# Patient Record
Sex: MALE | Race: WHITE | NOT HISPANIC OR LATINO | Employment: UNEMPLOYED | URBAN - METROPOLITAN AREA
[De-identification: names, ages, dates, MRNs, and addresses within clinical notes are randomized per-mention and may not be internally consistent; named-entity substitution may affect disease eponyms.]

---

## 2018-09-21 ENCOUNTER — ANESTHESIA EVENT (OUTPATIENT)
Dept: GASTROENTEROLOGY | Facility: HOSPITAL | Age: 53
End: 2018-09-21
Payer: COMMERCIAL

## 2018-09-24 ENCOUNTER — HOSPITAL ENCOUNTER (OUTPATIENT)
Facility: HOSPITAL | Age: 53
Setting detail: OUTPATIENT SURGERY
Discharge: HOME | End: 2018-09-24
Attending: COLON & RECTAL SURGERY | Admitting: COLON & RECTAL SURGERY
Payer: COMMERCIAL

## 2018-09-24 ENCOUNTER — ANESTHESIA (OUTPATIENT)
Dept: GASTROENTEROLOGY | Facility: HOSPITAL | Age: 53
End: 2018-09-24
Payer: COMMERCIAL

## 2018-09-24 VITALS
HEIGHT: 67 IN | BODY MASS INDEX: 29.82 KG/M2 | RESPIRATION RATE: 16 BRPM | DIASTOLIC BLOOD PRESSURE: 60 MMHG | OXYGEN SATURATION: 98 % | SYSTOLIC BLOOD PRESSURE: 102 MMHG | HEART RATE: 48 BPM | WEIGHT: 190 LBS | TEMPERATURE: 98.6 F

## 2018-09-24 DIAGNOSIS — R19.7 DIARRHEA: ICD-10-CM

## 2018-09-24 DIAGNOSIS — R10.9 ABDOMINAL PAIN: ICD-10-CM

## 2018-09-24 PROCEDURE — 88305 TISSUE EXAM BY PATHOLOGIST: CPT | Performed by: PATHOLOGY

## 2018-09-24 RX ORDER — PROPOFOL 10 MG/ML
INJECTION, EMULSION INTRAVENOUS AS NEEDED
Status: DISCONTINUED | OUTPATIENT
Start: 2018-09-24 | End: 2018-09-24 | Stop reason: SURG

## 2018-09-24 RX ORDER — SODIUM CHLORIDE 9 MG/ML
125 INJECTION, SOLUTION INTRAVENOUS CONTINUOUS
Status: DISCONTINUED | OUTPATIENT
Start: 2018-09-24 | End: 2018-09-24 | Stop reason: HOSPADM

## 2018-09-24 RX ADMIN — PROPOFOL 50 MG: 10 INJECTION, EMULSION INTRAVENOUS at 10:01

## 2018-09-24 RX ADMIN — PROPOFOL 50 MG: 10 INJECTION, EMULSION INTRAVENOUS at 10:09

## 2018-09-24 RX ADMIN — PROPOFOL 50 MG: 10 INJECTION, EMULSION INTRAVENOUS at 10:05

## 2018-09-24 RX ADMIN — PROPOFOL 150 MG: 10 INJECTION, EMULSION INTRAVENOUS at 09:57

## 2018-09-24 RX ADMIN — LIDOCAINE HYDROCHLORIDE 60 MG: 20 INJECTION, SOLUTION INTRAVENOUS at 09:57

## 2018-09-24 RX ADMIN — SODIUM CHLORIDE 125 ML/HR: 0.9 INJECTION, SOLUTION INTRAVENOUS at 09:26

## 2018-09-24 NOTE — DISCHARGE SUMMARY
[facility]                                                                                 DISCHARGE SUMMARY        PATIENT NAME: Brien Casarez    :  1965  MRN: [id number]  Pt Location: [facility]    DISCHARGE DATE: 2018    PROCEDURE: Procedure(s) (LRB):  COLONOSCOPY with polypectomy (N/A)    Anesthesia Type: IV Sedation with Anesthesia    Complications: None    Specimen(s):  ID Type Source Tests Collected by Time Destination   1 : Ascending colon polyps x2 Tissue Large Intestine, Right/Ascending Colon TISSUE EXAM Manuel Martinez MD 2018 1004        HOSPITAL COURSE: The patient was admitted for elective procedure  The procedure was uncomplicated and the the patient was discharged home post procedure          SIGNATURE: Manuel Martinez MD  DATE: 2018  TIME: 10:14 AM

## 2018-09-24 NOTE — OP NOTE
OPERATIVE REPORT  PATIENT NAME: Gloria Olson    :  1965  MRN: [id number]  Pt Location: [facility]    SURGERY DATE: 2018    Indications:  Recurrent abdominal pain    Procedure:  Colonoscopy to cecum with snare polypectomies    Findings:  Ascending colon polyps    Anesthesia Type: IV Sedation with Anesthesia    Complications: None      Procedure: The patient was in the left lateral position  Sedation was administered by anesthesia  Rectal exam was unremarkable  The scope was introduced and passed to the cecum  The scope was withdrawn  In the ascending colon with 2 5 mm polyps excised by cold snare  The scope was withdrawn  The patient has scattered sigmoid diverticulosis  There was some mild erythema of the sigmoid colon consistent with a mild diverticular colitis or possibly a healing diverticulitis  The scope was removed  Impression:  Mild inflammation of the sigmoid colon may account for some of the patient's symptoms  Plan:  Consider antibiotics if symptoms are continue  Specimen(s):  ID Type Source Tests Collected by Time Destination   1 : Ascending colon polyps x2 Tissue Large Intestine, Right/Ascending Colon TISSUE EXAM Gabino Harding MD 2018 1004          Attestation: I was present for the entire procedure        Patient Disposition: PACU      SIGNATURE: Gabino Harding MD  DATE: 2018  TIME: 10:13 AM

## 2018-09-24 NOTE — ANESTHESIA PREPROCEDURE EVALUATION
Review of Systems/Medical History  Patient summary reviewed  Chart reviewed  No history of anesthetic complications     Cardiovascular  Negative cardio ROS Hypertension on > 1 medication,    Pulmonary  Negative pulmonary ROS        GI/Hepatic    Bowel prep       Negative  ROS        Endo/Other  Negative endo/other ROS History of thyroid disease ,      GYN  Negative gynecology ROS          Hematology  Negative hematology ROS      Musculoskeletal  Negative musculoskeletal ROS        Neurology  Negative neurology ROS      Psychology   Negative psychology ROS              Physical Exam    Airway    Mallampati score: I  TM Distance: >3 FB  Neck ROM: full     Dental   No notable dental hx     Cardiovascular  Comment: Negative ROS, Rhythm: regular, Rate: normal, Cardiovascular exam normal    Pulmonary  Pulmonary exam normal Breath sounds clear to auscultation,     Other Findings        Anesthesia Plan  ASA Score- 2     Anesthesia Type-     Plan Factors-    Induction- intravenous  Postoperative Plan- Plan for postoperative opioid use  Informed Consent- Anesthetic plan and risks discussed with patient  I personally reviewed this patient with the CRNA  Discussed and agreed on the Anesthesia Plan with the CRNA  Noa Mckenzie

## 2018-09-24 NOTE — DISCHARGE INSTRUCTIONS
[facility]  [facility] [address], [address] [place] [address] [place]  Phone: [phone]    DISCHARGE INSTRUCTIONS:    1   ___ Complete Exam - Normal    2   ___ Exam normal, but entire colon not seen  We will discuss this with you  3   ___ Polyp(s) removed by "burning" - NO pathology report will follow    4   _2__ Polyp(s) removed by excision  Pathology report will be available in 4-5 days   Someone from our office will call you with results  5   ___ Exam prompted biopsies  Pathology report will be available in 4-5 days   Someone from our office will call you with results  6   ___ Exam demonstrated findings that need treatment  Prescriptions will be   Given to you  Return to our office in ____ weeks  Please call for appt  7   ___ Original office visit or colonoscopy findings necessitate an office visit  Please call to set up a new appointment    8   ___ Medication  __________________________________________        [address]:    - Go straight home and rest today    - No driving or drinking alcohol for 24 hours    - Resume regular diet and medications unless otherwise instructed  Coumadin and Plavix are blood thinners  You can resume these medications on __________  IF YOU ARE HAVING ANY FEVER, BLEEDING OR PERSISTENT PAIN IN THE ABDOMEN, PLEASE CALL OUR OFFICE ANY DAY OR TIME  [phone]        Colorectal Polyps   WHAT YOU NEED TO KNOW:   Colorectal polyps are small growths of tissue in the lining of the colon and rectum  Most polyps are hyperplastic polyps and are usually benign (noncancerous)  Certain types of polyps, called adenomatous polyps, may turn into cancer  DISCHARGE INSTRUCTIONS:   Follow up with your healthcare provider or gastroenterologist as directed: You may need to return for more tests, such as another colonoscopy  Write down your questions so you remember to ask them during your visits    Reduce your risk for colorectal polyps:   · Eat a variety of healthy foods:  Healthy foods include fruit, vegetables, whole-grain breads, low-fat dairy products, beans, lean meat, and fish  Ask if you need to be on a special diet  · Maintain a healthy weight:  Ask your healthcare provider if you need to lose weight and how much you need to lose  Ask for help with a weight loss program     · Exercise:  Begin to exercise slowly and do more as you get stronger  Talk with your healthcare provider before you start an exercise program      · Limit alcohol:  Your risk for polyps increases the more you drink  · Do not smoke: If you smoke, it is never too late to quit  Ask for information about how to stop  For support and more information:   · Charanjit Isaacs ([facility]) [address], [place] [address]  Phone: 2- [phone]  Web Address: www digestive  [facility]  Contact your healthcare provider or gastroenterologist if:   · You have a fever  · You have chills, a cough, or feel weak and achy  · You have abdominal pain that does not go away or gets worse after you take medicine  · Your abdomen is swollen  · You are losing weight without trying  · You have questions or concerns about your condition or care  Seek care immediately or call 911 if:   · You have sudden shortness of breath  · You have a fast heart rate, fast breathing, or are too dizzy to stand up  · You have severe abdominal pain  · You see blood in your bowel movement  © 2017 [employer] Information is for End User's use only and may not be sold, redistributed or otherwise used for commercial purposes  All illustrations and images included in CareNotes® are the copyrighted property of [employer]  or Jonathan [employer]  The above information is an  only  It is not intended as medical advice for individual conditions or treatments   Talk to your doctor, nurse or pharmacist before following any medical regimen to see if it is safe and effective for you  Diverticulosis   WHAT YOU NEED TO KNOW:   Diverticulosis is a condition that causes small pockets called diverticula to form in your intestine  These pockets make it difficult for bowel movements to pass through your digestive system  DISCHARGE INSTRUCTIONS:   Seek care immediately if:   · You have severe pain on the left side of your lower abdomen  · Your bowel movements are bright or dark red  Contact your healthcare provider if:   · You have a fever and chills  · You feel dizzy or lightheaded  · You have nausea, or you are vomiting  · You have a change in your bowel movements  · You have questions or concerns about your condition or care  Medicines:   · Medicines  to soften your bowel movements may be given  You may also need medicines to treat symptoms such as bloating and pain  · Take your medicine as directed  Contact your healthcare provider if you think your medicine is not helping or if you have side effects  Tell him or her if you are allergic to any medicine  Keep a list of the medicines, vitamins, and herbs you take  Include the amounts, and when and why you take them  Bring the list or the pill bottles to follow-up visits  Carry your medicine list with you in case of an emergency  Self-care: The goal of treatment is to manage any symptoms you have and prevent other problems such as diverticulitis  Diverticulitis is swelling or infection of the diverticula  Your healthcare provider may recommend any of the following:  · Eat a variety of high-fiber foods  High-fiber foods help you have regular bowel movements  High-fiber foods include cooked beans, fruits, vegetables, and some cereals  Most adults need 25 to 35 grams of fiber each day  Your healthcare provider may recommend that you have more  Ask your healthcare provider how much fiber you need  Increase fiber slowly   You may have abdominal discomfort, bloating, and gas if you add fiber to your diet too quickly  You may need to take a fiber supplement if you are not getting enough fiber from food  · Drink liquids as directed  You may need to drink 2 to 3 liters (8 to 12 cups) of liquids every day  Ask your healthcare provider how much liquid to drink each day and which liquids are best for you  · Apply heat  on your abdomen for 20 to 30 minutes every 2 hours for as many days as directed  Heat helps decrease pain and muscle spasms  Help prevent diverticulitis or other symptoms: The following may help decrease your risk for diverticulitis or symptoms, such as bleeding  Talk to your provider about these or other things you can do to prevent problems that may occur with diverticulosis  · Exercise regularly  Ask your healthcare provider about the best exercise plan for you  Exercise can help you have regular bowel movements  Get 30 minutes of exercise on most days of the week  · Maintain a healthy weight  Ask your healthcare provider how much you should weigh  Ask him or her to help you create a weight loss plan if you are overweight  · Do not smoke  Nicotine and other chemicals in cigarettes increase your risk for diverticulitis  Ask your healthcare provider for information if you currently smoke and need help to quit  E-cigarettes or smokeless tobacco still contain nicotine  Talk to your healthcare provider before you use these products  · Ask your healthcare provider if it is safe to take NSAIDs  NSAIDs may increase your risk of diverticulitis  Follow up with your healthcare provider as directed:  Write down your questions so you remember to ask them during your visits  © 2017 [employer] Information is for End User's use only and may not be sold, redistributed or otherwise used for commercial purposes   All illustrations and images included in CareNotes® are the copyrighted property of [employer]  or Jonathan Torres  The above information is an  only  It is not intended as medical advice for individual conditions or treatments  Talk to your doctor, nurse or pharmacist before following any medical regimen to see if it is safe and effective for you

## 2018-09-24 NOTE — PRE-PROCEDURE INSTRUCTIONS
Endo process reviewed with pt  And mother  Pt  Has had colonoscopy previously  Pt  Comfortable without further questions  Call bell in reach on rail

## 2021-11-24 ENCOUNTER — LAB REQUISITION (OUTPATIENT)
Dept: LAB | Facility: HOSPITAL | Age: 56
End: 2021-11-24
Payer: COMMERCIAL

## 2021-11-24 DIAGNOSIS — Z12.11 ENCOUNTER FOR SCREENING FOR MALIGNANT NEOPLASM OF COLON: ICD-10-CM

## 2021-11-24 DIAGNOSIS — Z86.010 PERSONAL HISTORY OF COLONIC POLYPS: ICD-10-CM

## 2021-11-24 PROCEDURE — 88305 TISSUE EXAM BY PATHOLOGIST: CPT | Performed by: PATHOLOGY

## 2022-09-07 ENCOUNTER — TELEPHONE (OUTPATIENT)
Dept: GASTROENTEROLOGY | Facility: CLINIC | Age: 57
End: 2022-09-07

## 2022-09-07 ENCOUNTER — CONSULT (OUTPATIENT)
Dept: GASTROENTEROLOGY | Facility: CLINIC | Age: 57
End: 2022-09-07
Payer: COMMERCIAL

## 2022-09-07 VITALS
HEART RATE: 64 BPM | BODY MASS INDEX: 30.29 KG/M2 | DIASTOLIC BLOOD PRESSURE: 99 MMHG | HEIGHT: 67 IN | WEIGHT: 193 LBS | SYSTOLIC BLOOD PRESSURE: 168 MMHG

## 2022-09-07 DIAGNOSIS — K21.00 GASTROESOPHAGEAL REFLUX DISEASE WITH ESOPHAGITIS WITHOUT HEMORRHAGE: Primary | ICD-10-CM

## 2022-09-07 DIAGNOSIS — R13.19 ESOPHAGEAL DYSPHAGIA: ICD-10-CM

## 2022-09-07 DIAGNOSIS — Z86.010 PERSONAL HISTORY OF COLONIC POLYPS: ICD-10-CM

## 2022-09-07 DIAGNOSIS — Z87.19 HISTORY OF DIVERTICULITIS: ICD-10-CM

## 2022-09-07 PROBLEM — K21.9 GERD (GASTROESOPHAGEAL REFLUX DISEASE): Status: ACTIVE | Noted: 2022-09-07

## 2022-09-07 PROBLEM — Z86.0100 PERSONAL HISTORY OF COLONIC POLYPS: Status: ACTIVE | Noted: 2022-09-07

## 2022-09-07 PROBLEM — Z86.0101 HX OF ADENOMATOUS COLONIC POLYPS: Status: ACTIVE | Noted: 2022-09-07

## 2022-09-07 PROBLEM — Z86.0101 HX OF ADENOMATOUS COLONIC POLYPS: Status: RESOLVED | Noted: 2022-09-07 | Resolved: 2022-09-07

## 2022-09-07 PROCEDURE — 99204 OFFICE O/P NEW MOD 45 MIN: CPT | Performed by: INTERNAL MEDICINE

## 2022-09-07 RX ORDER — OMEPRAZOLE 40 MG/1
40 CAPSULE, DELAYED RELEASE ORAL 2 TIMES DAILY
COMMUNITY
Start: 2022-09-01 | End: 2022-09-22

## 2022-09-07 RX ORDER — SUCRALFATE ORAL 1 G/10ML
1 SUSPENSION ORAL
COMMUNITY
Start: 2022-09-01 | End: 2022-09-22

## 2022-09-07 NOTE — TELEPHONE ENCOUNTER
[facility] Assessment    Name: Ruddy Villaseñor  YOB: 1965  Last Height: 5' 7" (1 702 m)  Last weight: 87 5 kg (193 lb)  BMI: 30 23 kg/m²  Procedure: Egd  Diagnosis: see order  Date of procedure: 9/22/22  Prep:   Responsible : yes  Phone#: [phone]    Name completing form: Davis Kwong  Date form completed: 09/07/22      If the patient answers yes to any of these questions, schedule in a hospital  Are you pregnant: No  Do you rely on a wheelchair for mobility: No  Have you been diagnosed with End Stage Renal Disease (ESRD): No  Do you need oxygen during the day: No  Have you had a heart attack or stroke within the past three months: No  Have you had a seizure within the past three months: No  Have you ever been informed by anesthesia that you have a difficult airway: No  Additional Questions  Have you had any cardiac testing or are under the care of a Cardiologist (see cardiac list): No  Cardiac list:   Do you have an implanted cardiac defibrillator: No (Comment:  This patient should be scheduled in the hospital)    Have any bleeding problems, such as anemia or hemophilia (If patient has H&H result below 8, schedule in hospital   H&H must be within 30 days of procedure): No    Had an organ transplant within the past 3 months: No    Do you have any present infections: No  Do you get short of breath when walking a few blocks: No  Have you been diagnosed with diabetes: No  Comments (provide cardiac provider information if applicable):

## 2022-09-07 NOTE — TELEPHONE ENCOUNTER
Scheduled date of EGD(as of today):9/22/22   Physician performing EGD:Dr Ritu Santiago   Location of EGD:  [facility]  Instructions reviewed with patient by: ivan  Clearances: n/a

## 2022-09-07 NOTE — PROGRESS NOTES
[facility] - Outpatient Consultation  Radha Griffith 62 y o  male MRN: [id number]  Encounter: [id number]          ASSESSMENT AND PLAN:      1  Gastroesophageal reflux disease with esophagitis without hemorrhage  For year and half  Worsening in nature  Recent exacerbations as described below  - EGD; Future    2  Esophageal dysphagia  Somewhat improved  We evaluate for mechanical/mucosal /neoplastic  He will continue with current medications   - EGD; Future    3  Personal history of colonic polyps  He will continue follow-up with his colorectal surgeon  4  History of diverticulitis  In July of 2020  He will otherwise follow-up in 3 weeks  ______________________________________________________________________    HPI:  Very pleasant 71-year-old gentleman with body year to half of intermittent gastroesophageal reflux  Started in October of last year took 20 mg of omeprazole continued with that  Was initiated on Celebrex for history of hip arthritis  In August had another exacerbation and started taking 40 mg a day  Two weeks ago had some pepperoni pizza had difficulty afterwards along with some mild dysphagia without melena bright red blood per rectum  Was started on 40 mg twice a day with Carafate  His symptomatology has improved  He denies any weight loss there is no family history of colon cancer or any other GI cancers for that matter  He is working on anti-reflux diet we discussed measures  There is no other family history of cancers of the gastrointestinal tract, pancreatitis, hepatitis, IBD, celiac disease  He does have a history of colon polyps his colon cancer screening is done elsewhere in up-to-date  REVIEW OF SYSTEMS:    CONSTITUTIONAL: Denies any fever, chills, rigors, and weight loss  HEENT: No earache or tinnitus  Denies hearing loss or visual disturbances  CARDIOVASCULAR: No chest pain or palpitations     RESPIRATORY: Denies any cough, hemoptysis, shortness of breath or dyspnea on exertion  GASTROINTESTINAL: As noted in the History of Present Illness  GENITOURINARY: No problems with urination  Denies any hematuria or dysuria  NEUROLOGIC: No dizziness or vertigo, denies headaches  MUSCULOSKELETAL: Denies any muscle or joint pain  SKIN: Denies skin rashes or itching  ENDOCRINE: Denies excessive thirst  Denies intolerance to heat or cold  PSYCHOSOCIAL: Denies depression or anxiety  Denies any recent memory loss  Historical Information   Past Medical History:   Diagnosis Date    Colon polyp     Disease of thyroid gland     GERD (gastroesophageal reflux disease)     Hyperlipidemia     Hypertension     Tubular adenoma of colon     hx of     Past Surgical History:   Procedure Laterality Date    COLONOSCOPY N/A 5/6/2016    Procedure: COLONOSCOPY;  Surgeon: Julia Dozier MD;  Location: [facility]; Service:     HEMORRHOID SURGERY      [facility] COLONOSCOPY FLX DX W/COLLJ SPEC WHEN PFRMD N/A 9/24/2018    Procedure: COLONOSCOPY with polypectomy;  Surgeon: Rose Mathew MD;  Location: [facility]; Service: Colorectal     Social History   Social History     Substance and Sexual Activity   Alcohol Use Yes    Comment: social     Social History     Substance and Sexual Activity   Drug Use No     Social History     Tobacco Use   Smoking Status Never Smoker   Smokeless Tobacco Former User    Types: Chew     History reviewed  No pertinent family history  Meds/Allergies       Current Outpatient Medications:     atenolol (TENORMIN) 50 mg tablet    levothyroxine 175 mcg tablet    losartan (COZAAR) 100 MG tablet    omeprazole (PriLOSEC) 40 MG capsule    sucralfate (CARAFATE) 1 g/10 mL suspension    Allergies   Allergen Reactions    Lisinopril      Other reaction(s): fatigue and lightheaded, anxiety    Verapamil      anxiety           Objective     Blood pressure 168/99, pulse 64, height 5' 7" (1 702 m), weight 87 5 kg (193 lb)   Body mass index is 30 23 kg/m²  PHYSICAL EXAM:      General Appearance:   Alert, cooperative, no distress   HEENT:   Normocephalic, atraumatic, anicteric      Neck:  Supple, symmetrical, trachea midline   Lungs:   Clear to auscultation bilaterally; no rales, rhonchi or wheezing; respirations unlabored    Heart[de-identified]   Regular rate and rhythm; no murmur, rub, or gallop  Abdomen:   Soft, non-tender, non-distended; normal bowel sounds; no masses, no organomegaly    Genitalia:   Deferred    Rectal:   Deferred    Extremities:  No cyanosis, clubbing or edema    Pulses:  2+ and symmetric    Skin:  No jaundice, rashes, or lesions    Lymph nodes:  No palpable cervical lymphadenopathy        Lab Results:   No visits with results within 1 Day(s) from this visit  Latest known visit with results is:   Lab Requisition on 11/24/2021   Component Date Value    Case Report 11/24/2021                      Value:Surgical Pathology Report                         Case: [id number]                                   Authorizing Provider:  Prince Burrows MD              Collected:           11/24/2021 1235              Ordering Location:     [address] [place] [address]      Received:            11/24/2021 [facility]                                     Hospital Specialty                                                                                  Laboratory                                                                   Pathologist:           Beatrice Bird MD                                                              Specimen:    Polyp, Colorectal, cecal and ascending x2                                                  Final Diagnosis 11/24/2021                      Value: This result contains rich text formatting which cannot be displayed here   Note 11/24/2021                      Value: This result contains rich text formatting which cannot be displayed here   Additional Information 11/24/2021                      Value: This result contains rich text formatting which cannot be displayed here   Synoptic Checklist 11/24/2021                      Value:                            COLON/RECTUM POLYP FORM - GI - All Specimens                                                                                     :    Adenoma(s)      Gross Description 11/24/2021                      Value: This result contains rich text formatting which cannot be displayed here  Radiology Results:   No results found

## 2022-09-07 NOTE — H&P (VIEW-ONLY)
[facility] - Outpatient Consultation  Abraham Jeronimo 62 y o  male MRN: [id number]  Encounter: [id number]          ASSESSMENT AND PLAN:      1  Gastroesophageal reflux disease with esophagitis without hemorrhage  For year and half  Worsening in nature  Recent exacerbations as described below  - EGD; Future    2  Esophageal dysphagia  Somewhat improved  We evaluate for mechanical/mucosal /neoplastic  He will continue with current medications   - EGD; Future    3  Personal history of colonic polyps  He will continue follow-up with his colorectal surgeon  4  History of diverticulitis  In July of 2020  He will otherwise follow-up in 3 weeks  ______________________________________________________________________    HPI:  Very pleasant 49-year-old gentleman with body year to half of intermittent gastroesophageal reflux  Started in October of last year took 20 mg of omeprazole continued with that  Was initiated on Celebrex for history of hip arthritis  In August had another exacerbation and started taking 40 mg a day  Two weeks ago had some pepperoni pizza had difficulty afterwards along with some mild dysphagia without melena bright red blood per rectum  Was started on 40 mg twice a day with Carafate  His symptomatology has improved  He denies any weight loss there is no family history of colon cancer or any other GI cancers for that matter  He is working on anti-reflux diet we discussed measures  There is no other family history of cancers of the gastrointestinal tract, pancreatitis, hepatitis, IBD, celiac disease  He does have a history of colon polyps his colon cancer screening is done elsewhere in up-to-date  REVIEW OF SYSTEMS:    CONSTITUTIONAL: Denies any fever, chills, rigors, and weight loss  HEENT: No earache or tinnitus  Denies hearing loss or visual disturbances  CARDIOVASCULAR: No chest pain or palpitations     RESPIRATORY: Denies any cough, hemoptysis, shortness of breath or dyspnea on exertion  GASTROINTESTINAL: As noted in the History of Present Illness  GENITOURINARY: No problems with urination  Denies any hematuria or dysuria  NEUROLOGIC: No dizziness or vertigo, denies headaches  MUSCULOSKELETAL: Denies any muscle or joint pain  SKIN: Denies skin rashes or itching  ENDOCRINE: Denies excessive thirst  Denies intolerance to heat or cold  PSYCHOSOCIAL: Denies depression or anxiety  Denies any recent memory loss  Historical Information   Past Medical History:   Diagnosis Date    Colon polyp     Disease of thyroid gland     GERD (gastroesophageal reflux disease)     Hyperlipidemia     Hypertension     Tubular adenoma of colon     hx of     Past Surgical History:   Procedure Laterality Date    COLONOSCOPY N/A 5/6/2016    Procedure: COLONOSCOPY;  Surgeon: Cinthya Nunez MD;  Location: [facility]; Service:     HEMORRHOID SURGERY      [place] COLONOSCOPY FLX DX W/COLLJ SPEC WHEN PFRMD N/A 9/24/2018    Procedure: COLONOSCOPY with polypectomy;  Surgeon: Karen Acevedo MD;  Location: [facility]; Service: Colorectal     Social History   Social History     Substance and Sexual Activity   Alcohol Use Yes    Comment: social     Social History     Substance and Sexual Activity   Drug Use No     Social History     Tobacco Use   Smoking Status Never Smoker   Smokeless Tobacco Former User    Types: Chew     History reviewed  No pertinent family history  Meds/Allergies       Current Outpatient Medications:     atenolol (TENORMIN) 50 mg tablet    levothyroxine 175 mcg tablet    losartan (COZAAR) 100 MG tablet    omeprazole (PriLOSEC) 40 MG capsule    sucralfate (CARAFATE) 1 g/10 mL suspension    Allergies   Allergen Reactions    Lisinopril      Other reaction(s): fatigue and lightheaded, anxiety    Verapamil      anxiety           Objective     Blood pressure 168/99, pulse 64, height 5' 7" (1 702 m), weight 87 5 kg (193 lb)   Body mass index is 30 23 kg/m²  PHYSICAL EXAM:      General Appearance:   Alert, cooperative, no distress   HEENT:   Normocephalic, atraumatic, anicteric      Neck:  Supple, symmetrical, trachea midline   Lungs:   Clear to auscultation bilaterally; no rales, rhonchi or wheezing; respirations unlabored    Heart[de-identified]   Regular rate and rhythm; no murmur, rub, or gallop  Abdomen:   Soft, non-tender, non-distended; normal bowel sounds; no masses, no organomegaly    Genitalia:   Deferred    Rectal:   Deferred    Extremities:  No cyanosis, clubbing or edema    Pulses:  2+ and symmetric    Skin:  No jaundice, rashes, or lesions    Lymph nodes:  No palpable cervical lymphadenopathy        Lab Results:   No visits with results within 1 Day(s) from this visit  Latest known visit with results is:   Lab Requisition on 11/24/2021   Component Date Value    Case Report 11/24/2021                      Value:Surgical Pathology Report                         Case: [id number]                                   Authorizing Provider:  Davian Jose MD              Collected:           11/24/2021 1235              Ordering Location:     [address] [place] [address]      Received:            11/24/2021 [address] [place] [address] Specialty                                                                                  Laboratory                                                                   Pathologist:           Angelique Jackson MD                                                              Specimen:    Polyp, Colorectal, cecal and ascending x2                                                  Final Diagnosis 11/24/2021                      Value: This result contains rich text formatting which cannot be displayed here   Note 11/24/2021                      Value: This result contains rich text formatting which cannot be displayed here   Additional Information 11/24/2021                      Value: This result contains rich text formatting which cannot be displayed here   Synoptic Checklist 11/24/2021                      Value:                            COLON/RECTUM POLYP FORM - GI - All Specimens                                                                                     :    Adenoma(s)      Gross Description 11/24/2021                      Value: This result contains rich text formatting which cannot be displayed here  Radiology Results:   No results found

## 2022-09-16 ENCOUNTER — TELEPHONE (OUTPATIENT)
Dept: GASTROENTEROLOGY | Facility: CLINIC | Age: 57
End: 2022-09-16

## 2022-09-16 NOTE — TELEPHONE ENCOUNTER
I lmom confirming pt's egd scheduled on 9/22/22 at [facility] with Dr Alyssa Wright  Informed pt [facility] would be calling 1-2 days prior with arrival time  Informed pt to be npo after midnight and  would need a  the day of the procedure due to being under sedation  I asked pt to please call back if has not received instructions or if has any questions  Advised pt to contact insurance if has any questions regarding coverage of procedure

## 2022-09-22 ENCOUNTER — HOSPITAL ENCOUNTER (OUTPATIENT)
Dept: GASTROENTEROLOGY | Facility: AMBULATORY SURGERY CENTER | Age: 57
Discharge: HOME | End: 2022-09-22
Payer: COMMERCIAL

## 2022-09-22 ENCOUNTER — ANESTHESIA (OUTPATIENT)
Dept: GASTROENTEROLOGY | Facility: AMBULATORY SURGERY CENTER | Age: 57
End: 2022-09-22

## 2022-09-22 ENCOUNTER — ANESTHESIA EVENT (OUTPATIENT)
Dept: GASTROENTEROLOGY | Facility: AMBULATORY SURGERY CENTER | Age: 57
End: 2022-09-22

## 2022-09-22 VITALS
DIASTOLIC BLOOD PRESSURE: 86 MMHG | HEIGHT: 67 IN | BODY MASS INDEX: 29.82 KG/M2 | OXYGEN SATURATION: 97 % | RESPIRATION RATE: 18 BRPM | WEIGHT: 190 LBS | TEMPERATURE: 98.1 F | SYSTOLIC BLOOD PRESSURE: 118 MMHG | HEART RATE: 57 BPM

## 2022-09-22 DIAGNOSIS — R13.19 ESOPHAGEAL DYSPHAGIA: ICD-10-CM

## 2022-09-22 DIAGNOSIS — K21.00 GASTROESOPHAGEAL REFLUX DISEASE WITH ESOPHAGITIS WITHOUT HEMORRHAGE: ICD-10-CM

## 2022-09-22 PROCEDURE — 43239 EGD BIOPSY SINGLE/MULTIPLE: CPT | Performed by: INTERNAL MEDICINE

## 2022-09-22 RX ORDER — PROPOFOL 10 MG/ML
INJECTION, EMULSION INTRAVENOUS AS NEEDED
Status: DISCONTINUED | OUTPATIENT
Start: 2022-09-22 | End: 2022-09-22

## 2022-09-22 RX ORDER — SERTRALINE HYDROCHLORIDE 25 MG/1
25 TABLET, FILM COATED ORAL DAILY
COMMUNITY
Start: 2022-09-15 | End: 2023-09-15

## 2022-09-22 RX ORDER — LIDOCAINE HYDROCHLORIDE 20 MG/ML
INJECTION, SOLUTION EPIDURAL; INFILTRATION; INTRACAUDAL; PERINEURAL AS NEEDED
Status: DISCONTINUED | OUTPATIENT
Start: 2022-09-22 | End: 2022-09-22

## 2022-09-22 RX ORDER — FAMOTIDINE 20 MG/1
20 TABLET, FILM COATED ORAL
Qty: 90 TABLET | Refills: 1 | Status: SHIPPED | OUTPATIENT
Start: 2022-09-22

## 2022-09-22 RX ORDER — SODIUM CHLORIDE 9 MG/ML
20 INJECTION, SOLUTION INTRAVENOUS CONTINUOUS
Status: DISCONTINUED | OUTPATIENT
Start: 2022-09-22 | End: 2022-09-26 | Stop reason: HOSPADM

## 2022-09-22 RX ORDER — SODIUM CHLORIDE 9 MG/ML
INJECTION, SOLUTION INTRAVENOUS CONTINUOUS PRN
Status: DISCONTINUED | OUTPATIENT
Start: 2022-09-22 | End: 2022-09-22

## 2022-09-22 RX ADMIN — LIDOCAINE HYDROCHLORIDE 100 MG: 20 INJECTION, SOLUTION EPIDURAL; INFILTRATION; INTRACAUDAL; PERINEURAL at 12:19

## 2022-09-22 RX ADMIN — PROPOFOL 30 MG: 10 INJECTION, EMULSION INTRAVENOUS at 12:22

## 2022-09-22 RX ADMIN — SODIUM CHLORIDE: 9 INJECTION, SOLUTION INTRAVENOUS at 11:57

## 2022-09-22 RX ADMIN — PROPOFOL 100 MG: 10 INJECTION, EMULSION INTRAVENOUS at 12:19

## 2022-09-22 RX ADMIN — PROPOFOL 20 MG: 10 INJECTION, EMULSION INTRAVENOUS at 12:25

## 2022-09-22 NOTE — INTERVAL H&P NOTE
H&P reviewed  After examining the patient I find no changes in the patients condition since the H&P had been written      Vitals:    09/22/22 1129   BP: 121/79   Pulse: 58   Resp: 18   Temp: 98 1 °F (36 7 °C)   SpO2: 97%

## 2022-09-22 NOTE — ANESTHESIA POSTPROCEDURE EVALUATION
Post-Op Assessment Note    CV Status:  Stable  Pain Score: 0    Pain management: adequate     Mental Status:  Alert and awake   Hydration Status:  Euvolemic   PONV Controlled:  Controlled   Airway Patency:  Patent      Post Op Vitals Reviewed: Yes      Staff: Anesthesiologist, CRNA         No complications documented      BP   110/70   Temp      Pulse  56   Resp  20    SpO2   99% Benzoyl Peroxide Pregnancy And Lactation Text: This medication is Pregnancy Category C. It is unknown if benzoyl peroxide is excreted in breast milk.

## 2022-09-22 NOTE — ANESTHESIA PREPROCEDURE EVALUATION
Procedure:  EGD    Relevant Problems   GI/HEPATIC   (+) Esophageal dysphagia   (+) GERD (gastroesophageal reflux disease)      NEURO/PSYCH   (+) History of diverticulitis   (+) Personal history of colonic polyps        Physical Exam    Airway    Mallampati score: II  TM Distance: >3 FB  Neck ROM: full     Dental   No notable dental hx     Cardiovascular      Pulmonary      Other Findings        Anesthesia Plan  ASA Score- 2     Anesthesia Type- IV sedation with anesthesia with ASA Monitors  Additional Monitors:   Airway Plan:     Comment: Npo after MN (sip of water with meds @ 08:00)  Plan Factors-Exercise tolerance (METS): >4 METS  Chart reviewed  Patient summary reviewed  Patient is not a current smoker  Induction- intravenous  Postoperative Plan-     Informed Consent- Anesthetic plan and risks discussed with patient  I personally reviewed this patient with the CRNA  Discussed and agreed on the Anesthesia Plan with the CRNA  Karrie Nissen

## 2022-12-09 ENCOUNTER — OFFICE VISIT (OUTPATIENT)
Dept: GASTROENTEROLOGY | Facility: CLINIC | Age: 57
End: 2022-12-09

## 2022-12-09 VITALS — WEIGHT: 191 LBS | BODY MASS INDEX: 29.98 KG/M2 | HEIGHT: 67 IN

## 2022-12-09 DIAGNOSIS — K60.2 ANAL FISSURE: Primary | ICD-10-CM

## 2022-12-09 DIAGNOSIS — K21.9 GASTROESOPHAGEAL REFLUX DISEASE, UNSPECIFIED WHETHER ESOPHAGITIS PRESENT: ICD-10-CM

## 2022-12-09 DIAGNOSIS — R13.19 ESOPHAGEAL DYSPHAGIA: ICD-10-CM

## 2022-12-09 RX ORDER — LEVOTHYROXINE SODIUM 0.2 MG/1
TABLET ORAL
COMMUNITY
Start: 2022-11-17

## 2022-12-09 RX ORDER — CELECOXIB 200 MG/1
200 CAPSULE ORAL EVERY MORNING
COMMUNITY
Start: 2022-10-29

## 2022-12-09 RX ORDER — NITROGLYCERIN 4 MG/G
OINTMENT RECTAL EVERY 12 HOURS SCHEDULED
Qty: 30 G | Refills: 1 | Status: SHIPPED | OUTPATIENT
Start: 2022-12-09

## 2022-12-09 RX ORDER — FAMOTIDINE 20 MG/1
40 TABLET, FILM COATED ORAL DAILY
Qty: 60 TABLET | Refills: 2 | Status: SHIPPED | OUTPATIENT
Start: 2022-12-09

## 2022-12-09 NOTE — PROGRESS NOTES
[facility] - Outpatient Follow-up Note  Yessi Yoder 62 y o  male MRN: [id number]  Encounter: [id number]          ASSESSMENT AND PLAN:      1  Esophageal dysphagia  Patient with actual difficulty swallowing but just feels like solid food is not passing in his lower esophagus, suspect symptoms are related to esophagitis rather than dysmotility and no evidence of obstructive lesion on EGD  We will plan for barium swallow with 13 mm tablet for further evaluation   - FL barium swallow; Future  - famotidine (PEPCID) 20 mg tablet; Take 2 tablets (40 mg total) by mouth daily  Dispense: 60 tablet; Refill: 2    2  Gastroesophageal reflux disease, unspecified whether esophagitis present  Recommend to continue on the Omeprazole 40 mg in the morning and 40 mg of famotidine at bedtime and then can decrease the dose of the Pepcid at bedtime when he is feeling better    3  Anal fissure  Patient has been watching his diet and did eat something that caused hard stool and now had a tearing feeling with a little bit of blood but still is having discomfort, Rectiv has been ordered and we will get this if insurance approves this  - nitroglycerin (Rectiv) 0 4 %; Insert into the rectum every 12 (twelve) hours  Dispense: 30 g; Refill: 1    ______________________________________________________________________    SUBJECTIVE:       This is a 59-year-old male who presents today for follow-up to EGD  Pt has had symptoms that started in October of last year took 20 mg of omeprazole continued with that  Was initiated on Celebrex for history of hip arthritis  In August  had another exacerbation and started taking 40 mg a day      Patient reports that he was doing well for about 3 weeks after his endoscopy in November but then had some dietary indiscretion and he also had eaten late at night and now was having symptoms of discomfort in his chest and feeling like something has to go over that area while eating, but otherwise does not have any actual dysphagia but he does get a sensation in his chest generally in the morning when swallowing his levothyroxine  He has now been taking the omeprazole 40 mg in the morning and then 20 mg of Pepcid in the evening  EGD had shown gastritis and esophagitis with reflux with no obstructive pathology  Biopsies were benign      There is no other family history of cancers of the gastrointestinal tract, pancreatitis, hepatitis, IBD, celiac disease  He does have a history of colon polyps his colon cancer screening is done elsewhere in up-to-date  Last colonoscopy was in November 2021 and is due in November 2024  REVIEW OF SYSTEMS IS OTHERWISE NEGATIVE  Historical Information   Past Medical History:   Diagnosis Date   • Anxiety    • Colon polyp    • Disease of thyroid gland    • Esophageal dysphagia    • GERD (gastroesophageal reflux disease)    • Hyperlipidemia    • Hypertension    • Seasonal allergies    • Tubular adenoma of colon     hx of     Past Surgical History:   Procedure Laterality Date   • COLONOSCOPY N/A 05/06/2016    Procedure: COLONOSCOPY;  Surgeon: Demetris Lott MD;  Location: [facility]; Service:    • EGD     • HEMORRHOID SURGERY     • [place] COLONOSCOPY FLX DX W/COLLJ SPEC WHEN PFRMD N/A 09/24/2018    Procedure: COLONOSCOPY with polypectomy;  Surgeon: Jarad Farias MD;  Location: [facility];   Service: Colorectal   • ROTATOR CUFF REPAIR      left   • TONSILLECTOMY       Social History   Social History     Substance and Sexual Activity   Alcohol Use Yes    Comment: social     Social History     Substance and Sexual Activity   Drug Use No     Social History     Tobacco Use   Smoking Status Never   Smokeless Tobacco Former   • Types: Chew     Family History   Problem Relation Age of Onset   • Colon cancer Maternal Grandfather        Meds/Allergies       Current Outpatient Medications:   •  atenolol (TENORMIN) 50 mg tablet  •  famotidine (PEPCID) 20 mg tablet  • famotidine (PEPCID) 20 mg tablet  •  levothyroxine 175 mcg tablet  •  losartan (COZAAR) 100 MG tablet  •  nitroglycerin (Rectiv) 0 4 %  •  sertraline (ZOLOFT) 25 mg tablet  •  celecoxib (CeleBREX) 200 mg capsule  •  levothyroxine 200 mcg tablet  •  omeprazole (PriLOSEC) 40 MG capsule    Allergies   Allergen Reactions   • Lisinopril      Other reaction(s): fatigue and lightheaded, anxiety   • Verapamil      anxiety           Objective     Height 5' 7" (1 702 m), weight 86 6 kg (191 lb)  Body mass index is 29 91 kg/m²  PHYSICAL EXAM:      General Appearance:   Alert, cooperative, no distress   HEENT:   Normocephalic, atraumatic, anicteric      Neck:  Supple, symmetrical, trachea midline   Lungs:   Clear to auscultation bilaterally; no rales, rhonchi or wheezing; respirations unlabored    Heart[de-identified]   Regular rate and rhythm; no murmur, rub, or gallop  Abdomen:   Soft, non-tender, non-distended; normal bowel sounds; no masses, no organomegaly    Genitalia:   Deferred    Rectal:   Deferred    Extremities:  No cyanosis, clubbing or edema    Pulses:  2+ and symmetric    Skin:  No jaundice, rashes, or lesions    Lymph nodes:  No palpable cervical lymphadenopathy        Lab Results:   No visits with results within 1 Day(s) from this visit     Latest known visit with results is:   Lab Requisition on 11/24/2021   Component Date Value   • Case Report 11/24/2021                      Value:Surgical Pathology Report                         Case: [id number]                                   Authorizing Provider:  Lexy Patrick MD              Collected:           11/24/2021 1235              Ordering Location:     [address] [place] [address]      Received:            11/24/2021 [address] [place] [address] Specialty                                                                                  Laboratory                                                                   Pathologist:           Judith Gay MD Specimen:    Polyp, Colorectal, cecal and ascending x2                                                 • Final Diagnosis 11/24/2021                      Value: This result contains rich text formatting which cannot be displayed here  • Note 11/24/2021                      Value: This result contains rich text formatting which cannot be displayed here  • Additional Information 11/24/2021                      Value: This result contains rich text formatting which cannot be displayed here  • Synoptic Checklist 11/24/2021                      Value:                            COLON/RECTUM POLYP FORM - GI - All Specimens                                                                                     :    Adenoma(s)     • Gross Description 11/24/2021                      Value: This result contains rich text formatting which cannot be displayed here  Radiology Results:   No results found

## 2022-12-16 ENCOUNTER — HOSPITAL ENCOUNTER (OUTPATIENT)
Dept: RADIOLOGY | Facility: HOSPITAL | Age: 57
Discharge: HOME | End: 2022-12-16

## 2022-12-16 DIAGNOSIS — R13.19 ESOPHAGEAL DYSPHAGIA: ICD-10-CM

## 2023-03-09 DIAGNOSIS — R13.19 ESOPHAGEAL DYSPHAGIA: ICD-10-CM

## 2023-03-09 RX ORDER — FAMOTIDINE 20 MG/1
TABLET, FILM COATED ORAL
Qty: 60 TABLET | Refills: 2 | Status: SHIPPED | OUTPATIENT
Start: 2023-03-09

## 2023-03-15 ENCOUNTER — TELEPHONE (OUTPATIENT)
Dept: GASTROENTEROLOGY | Facility: CLINIC | Age: 58
End: 2023-03-15

## 2023-03-15 ENCOUNTER — OFFICE VISIT (OUTPATIENT)
Dept: GASTROENTEROLOGY | Facility: CLINIC | Age: 58
End: 2023-03-15

## 2023-03-15 VITALS
HEIGHT: 67 IN | HEART RATE: 66 BPM | WEIGHT: 198 LBS | BODY MASS INDEX: 31.08 KG/M2 | DIASTOLIC BLOOD PRESSURE: 89 MMHG | SYSTOLIC BLOOD PRESSURE: 159 MMHG

## 2023-03-15 DIAGNOSIS — Z87.19 HISTORY OF DIVERTICULITIS: ICD-10-CM

## 2023-03-15 DIAGNOSIS — K60.2 ANAL FISSURE: ICD-10-CM

## 2023-03-15 DIAGNOSIS — R13.19 ESOPHAGEAL DYSPHAGIA: Primary | ICD-10-CM

## 2023-03-15 DIAGNOSIS — Z86.010 PERSONAL HISTORY OF COLONIC POLYPS: ICD-10-CM

## 2023-03-15 DIAGNOSIS — K21.9 GASTROESOPHAGEAL REFLUX DISEASE, UNSPECIFIED WHETHER ESOPHAGITIS PRESENT: ICD-10-CM

## 2023-03-15 NOTE — PATIENT INSTRUCTIONS
Next screening colonoscopy 11/24 due to history of colon polyps  Start Metamucil daily  Should the irritation in the posterior taste buds persist ears nose and throat consultation

## 2023-03-15 NOTE — PROGRESS NOTES
[facility] - Outpatient Follow-up Note  Jose Adrian 62 y o  male MRN: [id number]  Encounter: [id number]          ASSESSMENT AND PLAN:      1  Gastroesophageal reflux disease, unspecified whether esophagitis present  Well-controlled  We will change omeprazole to every other day continue with Pepcid 40 mg at night  He will avoid drinking his 1 ounce of bourbon each night appears to be a relation ship between that and his dysphagia  2  Esophageal dysphagia  Negative upper endoscopy and barium swallow with 13 mm tablet  Describes a period where posterior taste buds were irritated and swollen this is since improved  Should this not totally resolved ears nose and throat consultation  3  Anal fissure  Comes and goes with hard stools  Will work on increasing fiber and he will start Metamucil  Normal rectal exam today  4  Personal history of colonic polyps  He is due in November 2024 was getting his colonoscopies elsewhere desires to get them done here    5  History of diverticulitis  No abdominal pain  He will otherwise follow-up in 4 months     ______________________________________________________________________    SUBJECTIVE: Very pleasant 80-year-old gentleman we see for gastroesophageal reflux, nonobstructive dysphagia  Had recent EGD which is unremarkable including biopsies of the thoracic esophagus  He describes some irritation of his posterior taste buds this is improving  Suggested ears nose and throat consult if this persists or and does not resolve  No melena bright red blood per rectum  Occasionally does experience rectal pain after hard bowel movements  Discussed increasing fiber  Was treated for fissure in the past   Currently no abdominal pain      REVIEW OF SYSTEMS IS OTHERWISE NEGATIVE        Historical Information   Past Medical History:   Diagnosis Date   • Anxiety    • Colon polyp    • Disease of thyroid gland    • Esophageal dysphagia    • GERD (gastroesophageal reflux disease)    • Hyperlipidemia    • Hypertension    • Seasonal allergies    • Tubular adenoma of colon     hx of     Past Surgical History:   Procedure Laterality Date   • COLONOSCOPY N/A 05/06/2016    Procedure: COLONOSCOPY;  Surgeon: Jenny Graham MD;  Location: [facility]; Service:    • EGD     • HEMORRHOID SURGERY     • [place] COLONOSCOPY FLX DX W/COLLJ SPEC WHEN PFRMD N/A 09/24/2018    Procedure: COLONOSCOPY with polypectomy;  Surgeon: Alysa Mason MD;  Location: [facility]; Service: Colorectal   • ROTATOR CUFF REPAIR      left   • TONSILLECTOMY       Social History   Social History     Substance and Sexual Activity   Alcohol Use Yes    Comment: social     Social History     Substance and Sexual Activity   Drug Use No     Social History     Tobacco Use   Smoking Status Never   Smokeless Tobacco Former   • Types: Chew     Family History   Problem Relation Age of Onset   • Colon cancer Maternal Grandfather        Meds/Allergies       Current Outpatient Medications:   •  atenolol (TENORMIN) 50 mg tablet  •  celecoxib (CeleBREX) 200 mg capsule  •  famotidine (PEPCID) 20 mg tablet  •  famotidine (PEPCID) 20 mg tablet  •  levothyroxine 175 mcg tablet  •  levothyroxine 200 mcg tablet  •  losartan (COZAAR) 100 MG tablet  •  nitroglycerin (Rectiv) 0 4 %  •  omeprazole (PriLOSEC) 40 MG capsule  •  sertraline (ZOLOFT) 25 mg tablet    Allergies   Allergen Reactions   • Lisinopril      Other reaction(s): fatigue and lightheaded, anxiety   • Verapamil      anxiety           Objective     Blood pressure 159/89, pulse 66, height 5' 7" (1 702 m), weight 89 8 kg (198 lb)  Body mass index is 31 01 kg/m²        PHYSICAL EXAM:      General Appearance:   Alert, cooperative, no distress   HEENT:   Normocephalic, atraumatic, anicteric      Neck:  Supple, symmetrical, trachea midline   Lungs:   Clear to auscultation bilaterally; no rales, rhonchi or wheezing; respirations unlabored    Heart[de-identified]   Regular rate and rhythm; no murmur, rub, or gallop  Abdomen:   Soft, non-tender, non-distended; normal bowel sounds; no masses, no organomegaly    Genitalia:   Deferred    Rectal:    No masses blood fissures   Extremities:  No cyanosis, clubbing or edema    Pulses:  2+ and symmetric    Skin:  No jaundice, rashes, or lesions    Lymph nodes:  No palpable cervical lymphadenopathy        Lab Results:   No visits with results within 1 Day(s) from this visit  Latest known visit with results is:   Lab Requisition on 11/24/2021   Component Date Value   • Case Report 11/24/2021                      Value:Surgical Pathology Report                         Case: [id number]                                   Authorizing Provider:  Connie Abdul MD              Collected:           11/24/2021 1235              Ordering Location:     [address] [place] [address]      Received:            11/24/2021 [address] [place] [address] Specialty                                                                                  Laboratory                                                                   Pathologist:           Rashad Monsivais MD                                                              Specimen:    Polyp, Colorectal, cecal and ascending x2                                                 • Final Diagnosis 11/24/2021                      Value: This result contains rich text formatting which cannot be displayed here  • Note 11/24/2021                      Value: This result contains rich text formatting which cannot be displayed here  • Additional Information 11/24/2021                      Value: This result contains rich text formatting which cannot be displayed here     • Synoptic Checklist 11/24/2021                      Value:                            COLON/RECTUM POLYP FORM - GI - All Specimens                                                                                     :    Adenoma(s)     • Gross Description 11/24/2021 Value:This result contains rich text formatting which cannot be displayed here  Radiology Results:   No results found  Answers for HPI/ROS submitted by the patient on 3/8/2023  Chronicity: recurrent  Onset: more than 1 month ago  Onset quality: undetermined  Frequency: intermittently  Progression since onset: rapidly improving  Pain location: periumbilical region  Pain - numeric: 1/10  Pain quality: aching  Radiates to: does not radiate  anorexia: No  arthralgias: Yes  belching: Yes  constipation: No  diarrhea: No  dysuria: No  fever: No  flatus: No  frequency: No  headaches: No  hematochezia: No  hematuria: No  melena: No  myalgias: Yes  nausea:  No  weight loss: No  vomiting: No  Aggravated by: nothing  Relieved by: passing flatus  Diagnostic workup: upper endoscopy

## 2023-03-15 NOTE — TELEPHONE ENCOUNTER
Pt was seen in office with Dr González Santos whom wanted pt to f/u in 4 mos, however, schedule is not open yet  I informed pt that I would call him once schedule opens  Pt prefers [employer]

## 2023-06-03 DIAGNOSIS — R13.19 ESOPHAGEAL DYSPHAGIA: ICD-10-CM

## 2023-06-03 RX ORDER — FAMOTIDINE 20 MG/1
TABLET, FILM COATED ORAL
Qty: 60 TABLET | Refills: 2 | Status: SHIPPED | OUTPATIENT
Start: 2023-06-03

## 2023-07-05 ENCOUNTER — OFFICE VISIT (OUTPATIENT)
Dept: GASTROENTEROLOGY | Facility: CLINIC | Age: 58
End: 2023-07-05
Payer: COMMERCIAL

## 2023-07-05 VITALS — WEIGHT: 195 LBS | BODY MASS INDEX: 30.61 KG/M2 | HEIGHT: 67 IN

## 2023-07-05 DIAGNOSIS — R13.19 ESOPHAGEAL DYSPHAGIA: ICD-10-CM

## 2023-07-05 DIAGNOSIS — K21.9 GASTROESOPHAGEAL REFLUX DISEASE, UNSPECIFIED WHETHER ESOPHAGITIS PRESENT: Primary | ICD-10-CM

## 2023-07-05 DIAGNOSIS — Z87.19 HISTORY OF DIVERTICULITIS: ICD-10-CM

## 2023-07-05 DIAGNOSIS — K60.2 ANAL FISSURE: ICD-10-CM

## 2023-07-05 DIAGNOSIS — Z86.010 PERSONAL HISTORY OF COLONIC POLYPS: ICD-10-CM

## 2023-07-05 PROCEDURE — 99214 OFFICE O/P EST MOD 30 MIN: CPT | Performed by: INTERNAL MEDICINE

## 2023-07-05 RX ORDER — AZELASTINE HYDROCHLORIDE 137 UG/1
SPRAY, METERED NASAL
COMMUNITY
Start: 2023-07-02

## 2023-07-05 RX ORDER — CEPHALEXIN 500 MG/1
CAPSULE ORAL
COMMUNITY
Start: 2023-03-28

## 2023-07-05 NOTE — PROGRESS NOTES
Answers for HPI/ROS submitted by the patient on 7/1/2023  Chronicity: recurrent  Onset: more than 1 month ago  Onset quality: gradual  Frequency: intermittently  Episode duration: 3 Hours  Progression since onset: rapidly improving  Pain location: periumbilical region  Pain - numeric: 1/10  Pain quality: cramping  Radiates to: does not radiate  anorexia: No  arthralgias: Yes  belching: No  constipation: No  diarrhea: No  dysuria: No  fever: No  flatus: No  frequency: No  headaches: No  hematochezia: No  hematuria: No  melena: No  myalgias: Yes  nausea: No  weight loss: No  vomiting: No  Aggravated by: movement  Relieved by: movement    [facility] - Outpatient Follow-up Note  Wiliam Pederson 62 y.o. male MRN: [id number]  Encounter: [id number]          ASSESSMENT AND PLAN:      1. Gastroesophageal reflux disease, unspecified whether esophagitis present  Controlled on omeprazole 40 mg every other day, Pepcid 40 mg at night. 2. Esophageal dysphagia  Resolved    3. History of diverticulitis  Asymptomatic    4. Personal history of colonic polyps  Next colonoscopy November 2024    5. Anal fissure  Resolved. Patient will otherwise follow-up in 1 year.    ______________________________________________________________________    SUBJECTIVE: Very pleasant 60-year-old gentleman who we see for gastroesophageal reflux, dysphagia. He does have a history of diverticulitis. Was getting his colonoscopies elsewhere he is next due in November 2024. He is doing very well on omeprazole 40 mg every other day and Pepcid 40 mg at night. We discussed tapering plan. REVIEW OF SYSTEMS IS OTHERWISE NEGATIVE.       Historical Information   Past Medical History:   Diagnosis Date   • Anxiety    • Colon polyp    • Disease of thyroid gland    • Esophageal dysphagia    • GERD (gastroesophageal reflux disease)    • Hyperlipidemia    • Hypertension    • Seasonal allergies    • Tubular adenoma of colon     hx of     Past Surgical History:   Procedure Laterality Date   • BAND HEMORRHOIDECTOMY     • COLONOSCOPY N/A 05/06/2016    Procedure: COLONOSCOPY;  Surgeon: Kaiden Mendez MD;  Location: [facility]; Service:    • EGD     • HEMORRHOID SURGERY     • UT COLONOSCOPY FLX DX W/COLLJ SPEC WHEN PFRMD N/A 09/24/2018    Procedure: COLONOSCOPY with polypectomy;  Surgeon: Adam Barragan MD;  Location: [facility]; Service: Colorectal   • ROTATOR CUFF REPAIR      left   • TONSILLECTOMY     • UPPER GASTROINTESTINAL ENDOSCOPY       Social History   Social History     Substance and Sexual Activity   Alcohol Use Yes    Comment: social     Social History     Substance and Sexual Activity   Drug Use No     Social History     Tobacco Use   Smoking Status Never   Smokeless Tobacco Former   • Types: Chew     Family History   Problem Relation Age of Onset   • Colon cancer Maternal Grandfather        Meds/Allergies       Current Outpatient Medications:   •  atenolol (TENORMIN) 50 mg tablet  •  Azelastine HCl 137 MCG/SPRAY SOLN  •  celecoxib (CeleBREX) 200 mg capsule  •  cephalexin (KEFLEX) 500 mg capsule  •  famotidine (PEPCID) 20 mg tablet  •  levothyroxine 175 mcg tablet  •  levothyroxine 200 mcg tablet  •  losartan (COZAAR) 100 MG tablet  •  omeprazole (PriLOSEC) 40 MG capsule  •  sertraline (ZOLOFT) 25 mg tablet  •  famotidine (PEPCID) 20 mg tablet  •  nitroglycerin (Rectiv) 0.4 %    Allergies   Allergen Reactions   • Lisinopril      Other reaction(s): fatigue and lightheaded, anxiety   • Verapamil      anxiety           Objective     Height 5' 7" (1.702 m), weight 88.5 kg (195 lb). Body mass index is 30.54 kg/m².       PHYSICAL EXAM:      General Appearance:   Alert, cooperative, no distress   HEENT:   Normocephalic, atraumatic, anicteric.     Neck:  Supple, symmetrical, trachea midline   Lungs:   Clear to auscultation bilaterally; no rales, rhonchi or wheezing; respirations unlabored    Heart[de-identified]   Regular rate and rhythm; no murmur, rub, or gallop. Abdomen:   Soft, non-tender, non-distended; normal bowel sounds; no masses, no organomegaly    Genitalia:   Deferred    Rectal:   Deferred    Extremities:  No cyanosis, clubbing or edema    Pulses:  2+ and symmetric    Skin:  No jaundice, rashes, or lesions    Lymph nodes:  No palpable cervical lymphadenopathy        Lab Results:   No visits with results within 1 Day(s) from this visit. Latest known visit with results is:   Lab Requisition on 11/24/2021   Component Date Value   • Case Report 11/24/2021                      Value:Surgical Pathology Report                         Case: [id number]                                   Authorizing Provider:  Harika Santiago MD              Collected:           11/24/2021 1235              Ordering Location:     [facility]      Received:            11/24/2021 2113                                     Hospital Specialty                                                                                  Laboratory                                                                   Pathologist:           Judy Pandey MD                                                              Specimen:    Polyp, Colorectal, cecal and ascending x2                                                 • Final Diagnosis 11/24/2021                      Value: This result contains rich text formatting which cannot be displayed here. • Note 11/24/2021                      Value: This result contains rich text formatting which cannot be displayed here. • Additional Information 11/24/2021                      Value: This result contains rich text formatting which cannot be displayed here. • Synoptic Checklist 11/24/2021                      Value:                            COLON/RECTUM POLYP FORM - GI - All Specimens                                                                                     :    Adenoma(s)     • Gross Description 11/24/2021                      Value: This result contains rich text formatting which cannot be displayed here. Radiology Results:   XR shoulder 2+ vw right    Result Date: 6/20/2023  Narrative: History: Right shoulder pain, unspecified Study: 3 views right shoulder Comparison: None    Impression: Findings/impression: Moderate acromioclavicular and glenohumeral joint osteoarthritis with no acute fracture or malalignment. Soft tissues are normal. Workstation:[id number]    XR FOOT 3+ VW RIGHT    Result Date: 6/9/2023  Narrative: This result has an attachment that is not available. History: Chronic right foot pain. Interpretation: The right foot was examined with 3 views performed on 6/9/2023. Comparison: None. No acute fracture or dislocation. Alignment is normal. Significant joint space narrowing and marginal spurring is noted at the first metatarsophalangeal joint. Mild spurring is also noted at the second metatarsophalangeal joint. The remaining articular surfaces are unremarkable. No significant erosive changes. No destructive osseous lesion. Impression: Impression: Significant primary osteoarthritis is noted at the first metatarsophalangeal joint. No other acute osseous abnormality.  [id number]

## 2023-09-02 DIAGNOSIS — R13.19 ESOPHAGEAL DYSPHAGIA: ICD-10-CM

## 2023-09-05 DIAGNOSIS — R13.19 ESOPHAGEAL DYSPHAGIA: ICD-10-CM

## 2023-09-05 RX ORDER — FAMOTIDINE 20 MG/1
TABLET, FILM COATED ORAL
Qty: 60 TABLET | Refills: 2 | Status: SHIPPED | OUTPATIENT
Start: 2023-09-05

## 2023-09-05 RX ORDER — FAMOTIDINE 20 MG/1
40 TABLET, FILM COATED ORAL DAILY
Qty: 60 TABLET | Refills: 2 | OUTPATIENT
Start: 2023-09-05

## 2023-11-29 DIAGNOSIS — R13.19 ESOPHAGEAL DYSPHAGIA: ICD-10-CM

## 2023-11-29 RX ORDER — FAMOTIDINE 20 MG/1
TABLET, FILM COATED ORAL
Qty: 60 TABLET | Refills: 5 | Status: SHIPPED | OUTPATIENT
Start: 2023-11-29

## 2024-01-31 ENCOUNTER — TELEPHONE (OUTPATIENT)
Age: 59
End: 2024-01-31

## 2024-01-31 NOTE — TELEPHONE ENCOUNTER
Patients GI provider:  Adonis      Number to return call: ([phone]    Reason for call: Pt calling to see if we could provide a letter to excuse him form jury duty; he has an appt with Mariaa on 02.29 but got a letter for duty for this Sunday and he is concerned if he is chosen he would have to cxl his appt and he feels he really needs to be seen. He is asking if we could fax a letter to excuse him to the number provided by the [facility] and he was told to make sure we include his Juror ID with it. They told him the would need this no later then tomorrow afternoon to excuse him.    Fax- [phone]  Juror ID- [id number]    Scheduled procedure/appointment date if applicable: 02.09.24

## 2024-02-09 ENCOUNTER — OFFICE VISIT (OUTPATIENT)
Dept: GASTROENTEROLOGY | Facility: CLINIC | Age: 59
End: 2024-02-09
Payer: COMMERCIAL

## 2024-02-09 ENCOUNTER — TELEPHONE (OUTPATIENT)
Dept: GASTROENTEROLOGY | Facility: CLINIC | Age: 59
End: 2024-02-09

## 2024-02-09 VITALS
BODY MASS INDEX: 29.82 KG/M2 | SYSTOLIC BLOOD PRESSURE: 137 MMHG | WEIGHT: 190 LBS | HEIGHT: 67 IN | DIASTOLIC BLOOD PRESSURE: 86 MMHG | HEART RATE: 68 BPM

## 2024-02-09 DIAGNOSIS — R09.89 THROAT CLEARING: ICD-10-CM

## 2024-02-09 DIAGNOSIS — Z86.010 PERSONAL HISTORY OF COLONIC POLYPS: ICD-10-CM

## 2024-02-09 DIAGNOSIS — K21.9 GASTROESOPHAGEAL REFLUX DISEASE, UNSPECIFIED WHETHER ESOPHAGITIS PRESENT: Primary | ICD-10-CM

## 2024-02-09 DIAGNOSIS — R05.9 COUGH, UNSPECIFIED TYPE: ICD-10-CM

## 2024-02-09 PROCEDURE — 99213 OFFICE O/P EST LOW 20 MIN: CPT | Performed by: NURSE PRACTITIONER

## 2024-02-09 NOTE — TELEPHONE ENCOUNTER
-Telemetry  -Eliquis  -PO diltiazem 90 Q6H  -Cardiology consulted     Scheduled date of EGD/colonoscopy (as of today):3/28/24  Physician performing EGD/colonoscopy:Dr Guajardo   Location of EGD/colonoscopy:[facility]   Desired bowel prep reviewed with patient:clenpiq   Instructions reviewed with patient by:sb  Clearances:  none

## 2024-02-09 NOTE — PROGRESS NOTES
[facility] [place] - Outpatient Follow-up Note  Anshu Pearson 58 y.o. male MRN: [id number]  Encounter: [id number]          ASSESSMENT AND PLAN:      1. Gastroesophageal reflux disease, unspecified whether esophagitis present  2. Cough, unspecified type  3. Throat clearing  Patient with recurrent throat clearing, cough, globus sensation, discomfort in his chest after weaning off PPI and just taking Pepcid 40 mg daily at night. Reports he was doing well on omeprazole 40 mg every other day in the morning and Pepcid 40 mg at night, but then when he came off of PPI completely he had recurrent symptoms after the holidays.  He has now been back on omeprazole 40 mg daily and Pepcid 40 mg at night over the last 4 weeks but has not had any improvement in symptoms.  He saw ENT who thought evidence of reflux.  Symptoms likely secondary to GERD with esophagitis/LPR.    -Continue antireflux diet, elevating head of bed at night  -Avoid laying down after eating for 2 to 3 hours  -Avoid dietary triggers, decrease caffeine use  -Increase omeprazole to twice daily dosing for the next 2 weeks, then decrease to daily.  Continue Pepcid 40 mg daily at bedtime.  -Will schedule EGD for further evaluation.  Prep and procedure explained.  -Likely will need to continue on daily PPI in the future, however may be able to wean down to the 20 mg dose once symptoms are controlled again.    -     EGD; Future; Expected date: 02/09/2024  -     sodium picosulfate, magnesium oxide, citric acid (Clenpiq) oral solution; Take 175 mL (1 bottle) the evening before the colonoscopy, between 5 PM and 9 PM, followed by a second 175 mL bottle 5 hours before the colonoscopy.        4. Personal history of colonic polyps  Patient with history of tubular adenoma polyps, previously following with Dr. Israel for colon cancer screening.  His last colonoscopy was in November 2021 and he is due this year.  Will schedule surveillance colonoscopy  with EGD and he will check with his insurance to make sure this is covered, otherwise we will do EGD and wait to do colonoscopy later this year.  -     Colonoscopy; Future; Expected date: 02/09/2024        ______________________________________________________________________    SUBJECTIVE:  Anshu Pearson is a 58 y.o. male with history of anxiety, hypothyroidism, HLD, HTN, seasonal allergies, GERD, polyps presenting for follow-up due to flare of his reflux symptoms.  He was doing well at last office visit in July and omeprazole was decreased to every other day dosing and he continued Pepcid 40 mg at night.  He eventually stopped taking his omeprazole and was just taking Pepcid and over the holidays developed a dry cough, throat clearing, globus sensation.  He saw his PCP in the beginning of January and was restarted on omeprazole 40 mg daily and saw ENT who saw evidence of reflux on laryngoscopy.  He reports he has not had any symptomatic improvement in the last 4 weeks back on PPI.  He denies any dysphagia or odynophagia, however does feel burning discomfort in 2 areas in his chest and continues with frequent throat clearing and a dry cough worse in the morning and night.  Denies any unintended weight loss, change in bowel habit, black or bloody stool.  Uses NSAIDs for neck pain periodically.  Drinks alcohol socially, does not smoke.  He follows an antireflux diet and elevate tonight.  Denies any family history of colon cancer in first-degree relatives, however his grandfather did have colon cancer at age 98.  He was following with Dr. Israel for colon cancer screening and has a history of tubular adenoma polyps requiring colonoscopies every 3 years, he will be due later this year and would like to switch to have both done here.      REVIEW OF SYSTEMS IS OTHERWISE NEGATIVE.      Historical Information   Past Medical History:   Diagnosis Date    Anxiety     Colon polyp     Disease of thyroid gland     Esophageal  dysphagia     GERD (gastroesophageal reflux disease)     Hyperlipidemia     Hypertension     Seasonal allergies     Tubular adenoma of colon     hx of     Past Surgical History:   Procedure Laterality Date    BAND HEMORRHOIDECTOMY      COLONOSCOPY N/A 05/06/2016    Procedure: COLONOSCOPY;  Surgeon: Scott Arriaga MD;  Location: [facility];  Service:     EGD      HEMORRHOID SURGERY      CO COLONOSCOPY FLX DX W/COLLJ SPEC WHEN PFRMD N/A 09/24/2018    Procedure: COLONOSCOPY with polypectomy;  Surgeon: Willem Israel MD;  Location: [facility];  Service: Colorectal    ROTATOR CUFF REPAIR      left    TONSILLECTOMY      UPPER GASTROINTESTINAL ENDOSCOPY       Social History   Social History     Substance and Sexual Activity   Alcohol Use Yes    Alcohol/week: 7.0 standard drinks of alcohol    Types: 7 Standard drinks or equivalent per week    Comment: social     Social History     Substance and Sexual Activity   Drug Use No     Social History     Tobacco Use   Smoking Status Former   Smokeless Tobacco Former    Types: Chew     Family History   Problem Relation Age of Onset    Colon cancer Maternal Grandfather        Meds/Allergies       Current Outpatient Medications:     atenolol (TENORMIN) 50 mg tablet    Azelastine HCl 137 MCG/SPRAY SOLN    celecoxib (CeleBREX) 200 mg capsule    cephalexin (KEFLEX) 500 mg capsule    famotidine (PEPCID) 20 mg tablet    levothyroxine 175 mcg tablet    levothyroxine 200 mcg tablet    losartan (COZAAR) 100 MG tablet    omeprazole (PriLOSEC) 40 MG capsule    sertraline (ZOLOFT) 25 mg tablet    sodium picosulfate, magnesium oxide, citric acid (Clenpiq) oral solution    famotidine (PEPCID) 20 mg tablet    nitroglycerin (Rectiv) 0.4 %    Allergies   Allergen Reactions    Ciprofloxacin GI Intolerance    Metronidazole GI Intolerance    Levofloxacin Other (See Comments)    Lisinopril      Other reaction(s): fatigue and lightheaded, anxiety    Verapamil      anxiety           Objective     Blood pressure  "137/86, pulse 68, height 5' 7\" (1.702 m), weight 86.2 kg (190 lb). Body mass index is 29.76 kg/m².      PHYSICAL EXAM:      General Appearance:   Alert, cooperative, no distress   HEENT:   Normocephalic, atraumatic, anicteric.     Neck:  Supple, symmetrical, trachea midline   Lungs:   Clear to auscultation bilaterally; no rales, rhonchi or wheezing; respirations unlabored    Heart::   Regular rate and rhythm; no murmur.   Abdomen:   Soft, non-tender, non-distended; normal bowel sounds; no masses, no organomegaly    Genitalia:   Deferred    Rectal:   Deferred    Extremities:  No cyanosis, clubbing or edema    Skin:  No jaundice, rashes, or lesions    Lymph nodes:  No palpable cervical lymphadenopathy        Lab Results:   No visits with results within 1 Day(s) from this visit.   Latest known visit with results is:   Lab Requisition on 11/24/2021   Component Date Value    Case Report 11/24/2021                      Value:Surgical Pathology Report                         Case: [id number]                                   Authorizing Provider:  Willem Israel MD              Collected:           11/24/2021 1235              Ordering Location:     [facility]      Received:            11/24/2021 [facility]                                     Hospital Specialty                                                                                  Laboratory                                                                   Pathologist:           Morgan Espinoza MD                                                              Specimen:    Polyp, Colorectal, cecal and ascending x2                                                  Final Diagnosis 11/24/2021                      Value:This result contains rich text formatting which cannot be displayed here.    Note 11/24/2021                      Value:This result contains rich text formatting which cannot be displayed here.    Additional Information 11/24/2021                      " Value:This result contains rich text formatting which cannot be displayed here.    Synoptic Checklist 11/24/2021                      Value:                            COLON/RECTUM POLYP FORM - GI - All Specimens                                                                                     :    Adenoma(s)      Gross Description 11/24/2021                      Value:This result contains rich text formatting which cannot be displayed here.         Radiology Results:   No results found.

## 2024-02-09 NOTE — PATIENT INSTRUCTIONS
GERD (Gastroesophageal Reflux Disease)   WHAT YOU NEED TO KNOW:   Gastroesophageal reflux disease (GERD) is reflux that happens more than 2 times a week for a few weeks. Reflux means acid and food in your stomach back up into your esophagus. GERD can cause other health problems over time if it is not treated.       DISCHARGE INSTRUCTIONS:   Call your local emergency number (911 in the [place]) if:   You have severe chest pain and sudden trouble breathing.      Return to the emergency department if:   You have trouble breathing after you vomit.    You have trouble swallowing, or pain with swallowing.    Your bowel movements are black, bloody, or tarry-looking.    Your vomit looks like coffee grounds or has blood in it.    Call your doctor or gastroenterologist if:   You feel full and cannot burp or vomit.    You vomit large amounts, or you vomit often.    You are losing weight without trying.    Your symptoms get worse or do not improve with treatment.    You have questions or concerns about your condition or care.    Medicines:   Medicines  are used to decrease stomach acid. Medicine may also be used to help your lower esophageal sphincter and stomach contract (tighten) more.    Take your medicine as directed.  Contact your healthcare provider if you think your medicine is not helping or if you have side effects. Tell your provider if you are allergic to any medicine. Keep a list of the medicines, vitamins, and herbs you take. Include the amounts, and when and why you take them. Bring the list or the pill bottles to follow-up visits. Carry your medicine list with you in case of an emergency.    Manage GERD:       Do not have foods or drinks that may increase heartburn.  These include chocolate, peppermint, fried or fatty foods, drinks that contain caffeine, or carbonated drinks (soda). Other foods include spicy foods, onions, tomatoes, and tomato-based foods. Do not have foods or drinks that can irritate your esophagus,  such as citrus fruits, juices, and alcohol.    Do not eat large meals.  When you eat a lot of food at one time, your stomach needs more acid to digest it. Eat 6 small meals each day instead of 3 large ones, and eat slowly. Do not eat meals 2 to 3 hours before bedtime.    Elevate the head of your bed.  Place 6-inch blocks under the head of your bed frame. You may also use more than one pillow under your head and shoulders while you sleep.    Maintain a healthy weight.  If you are overweight, weight loss may help relieve symptoms of GERD.    Do not smoke.  Smoking weakens the lower esophageal sphincter and increases the risk of GERD. Ask your healthcare provider for information if you currently smoke and need help to quit. E-cigarettes or smokeless tobacco still contain nicotine. Talk to your healthcare provider before you use these products.    Do not put pressure on your abdomen.  Pressure pushes acid up into your esophagus. Do not wear clothing that is tight around your waist. Do not bend over. Bend at the knees if you need to pick something up.  Follow up with your doctor or gastroenterologist as directed:  Write down your questions so you remember to ask them during your visits.  © Copyright [employer] 2023 Information is for End User's use only and may not be sold, redistributed or otherwise used for commercial purposes.  The above information is an  only. It is not intended as medical advice for individual conditions or treatments. Talk to your doctor, nurse or pharmacist before following any medical regimen to see if it is safe and effective for you.

## 2024-03-14 ENCOUNTER — ANESTHESIA EVENT (OUTPATIENT)
Dept: ANESTHESIOLOGY | Facility: AMBULATORY SURGERY CENTER | Age: 59
End: 2024-03-14

## 2024-03-14 ENCOUNTER — ANESTHESIA (OUTPATIENT)
Dept: ANESTHESIOLOGY | Facility: AMBULATORY SURGERY CENTER | Age: 59
End: 2024-03-14

## 2024-03-19 ENCOUNTER — TELEPHONE (OUTPATIENT)
Age: 59
End: 2024-03-19

## 2024-03-19 NOTE — TELEPHONE ENCOUNTER
Patients GI provider:  José MOHR     Number to return call: [phone]    Reason for call: Pt calling because he has questions about what medications he can take before his procedure.     Scheduled procedure/appointment date if applicable: Procedure 3/28/24

## 2024-03-27 DIAGNOSIS — R13.19 ESOPHAGEAL DYSPHAGIA: Primary | ICD-10-CM

## 2024-03-27 RX ORDER — FAMOTIDINE 20 MG/1
TABLET, FILM COATED ORAL
Qty: 180 TABLET | Refills: 1 | Status: SHIPPED | OUTPATIENT
Start: 2024-03-27 | End: 2024-03-28 | Stop reason: SDUPTHER

## 2024-03-27 NOTE — TELEPHONE ENCOUNTER
Spoke to pt to confirm pt was still taking the requested prescription Famotidine. He is still taking. Pt of Gypsy Reynaga. Please cancel script for Famotidine & send new script. Pt was also just seen in our office in February for an o/v. Fax will be scanned to [place].

## 2024-03-28 ENCOUNTER — HOSPITAL ENCOUNTER (OUTPATIENT)
Dept: GASTROENTEROLOGY | Facility: AMBULATORY SURGERY CENTER | Age: 59
Discharge: HOME | End: 2024-03-28
Payer: COMMERCIAL

## 2024-03-28 ENCOUNTER — ANESTHESIA (OUTPATIENT)
Dept: GASTROENTEROLOGY | Facility: AMBULATORY SURGERY CENTER | Age: 59
End: 2024-03-28

## 2024-03-28 ENCOUNTER — ANESTHESIA EVENT (OUTPATIENT)
Dept: GASTROENTEROLOGY | Facility: AMBULATORY SURGERY CENTER | Age: 59
End: 2024-03-28

## 2024-03-28 VITALS
SYSTOLIC BLOOD PRESSURE: 101 MMHG | WEIGHT: 185 LBS | HEIGHT: 67 IN | OXYGEN SATURATION: 98 % | DIASTOLIC BLOOD PRESSURE: 65 MMHG | RESPIRATION RATE: 18 BRPM | HEART RATE: 52 BPM | BODY MASS INDEX: 29.03 KG/M2 | TEMPERATURE: 97 F

## 2024-03-28 DIAGNOSIS — Z86.010 PERSONAL HISTORY OF COLONIC POLYPS: ICD-10-CM

## 2024-03-28 DIAGNOSIS — K21.9 GASTROESOPHAGEAL REFLUX DISEASE, UNSPECIFIED WHETHER ESOPHAGITIS PRESENT: ICD-10-CM

## 2024-03-28 PROCEDURE — 43239 EGD BIOPSY SINGLE/MULTIPLE: CPT | Performed by: INTERNAL MEDICINE

## 2024-03-28 PROCEDURE — 45380 COLONOSCOPY AND BIOPSY: CPT | Performed by: INTERNAL MEDICINE

## 2024-03-28 PROCEDURE — 88305 TISSUE EXAM BY PATHOLOGIST: CPT | Performed by: PATHOLOGY

## 2024-03-28 RX ORDER — SODIUM CHLORIDE, SODIUM LACTATE, POTASSIUM CHLORIDE, CALCIUM CHLORIDE 600; 310; 30; 20 MG/100ML; MG/100ML; MG/100ML; MG/100ML
INJECTION, SOLUTION INTRAVENOUS CONTINUOUS PRN
Status: DISCONTINUED | OUTPATIENT
Start: 2024-03-28 | End: 2024-03-28

## 2024-03-28 RX ORDER — LIDOCAINE HYDROCHLORIDE 10 MG/ML
INJECTION, SOLUTION EPIDURAL; INFILTRATION; INTRACAUDAL; PERINEURAL AS NEEDED
Status: DISCONTINUED | OUTPATIENT
Start: 2024-03-28 | End: 2024-03-28

## 2024-03-28 RX ORDER — SODIUM CHLORIDE, SODIUM LACTATE, POTASSIUM CHLORIDE, CALCIUM CHLORIDE 600; 310; 30; 20 MG/100ML; MG/100ML; MG/100ML; MG/100ML
125 INJECTION, SOLUTION INTRAVENOUS CONTINUOUS
Status: DISCONTINUED | OUTPATIENT
Start: 2024-03-28 | End: 2024-04-01 | Stop reason: HOSPADM

## 2024-03-28 RX ORDER — PROPOFOL 10 MG/ML
INJECTION, EMULSION INTRAVENOUS AS NEEDED
Status: DISCONTINUED | OUTPATIENT
Start: 2024-03-28 | End: 2024-03-28

## 2024-03-28 RX ORDER — SODIUM CHLORIDE, SODIUM LACTATE, POTASSIUM CHLORIDE, CALCIUM CHLORIDE 600; 310; 30; 20 MG/100ML; MG/100ML; MG/100ML; MG/100ML
20 INJECTION, SOLUTION INTRAVENOUS CONTINUOUS
Status: DISCONTINUED | OUTPATIENT
Start: 2024-03-28 | End: 2024-04-01 | Stop reason: HOSPADM

## 2024-03-28 RX ADMIN — PROPOFOL 40 MG: 10 INJECTION, EMULSION INTRAVENOUS at 11:36

## 2024-03-28 RX ADMIN — PROPOFOL 50 MG: 10 INJECTION, EMULSION INTRAVENOUS at 11:40

## 2024-03-28 RX ADMIN — PROPOFOL 30 MG: 10 INJECTION, EMULSION INTRAVENOUS at 11:38

## 2024-03-28 RX ADMIN — PROPOFOL 30 MG: 10 INJECTION, EMULSION INTRAVENOUS at 11:45

## 2024-03-28 RX ADMIN — LIDOCAINE HYDROCHLORIDE 100 MG: 10 INJECTION, SOLUTION EPIDURAL; INFILTRATION; INTRACAUDAL; PERINEURAL at 11:30

## 2024-03-28 RX ADMIN — SODIUM CHLORIDE, SODIUM LACTATE, POTASSIUM CHLORIDE, CALCIUM CHLORIDE: 600; 310; 30; 20 INJECTION, SOLUTION INTRAVENOUS at 11:24

## 2024-03-28 RX ADMIN — PROPOFOL 30 MG: 10 INJECTION, EMULSION INTRAVENOUS at 11:33

## 2024-03-28 RX ADMIN — PROPOFOL 100 MG: 10 INJECTION, EMULSION INTRAVENOUS at 11:30

## 2024-03-28 NOTE — ANESTHESIA PREPROCEDURE EVALUATION
Procedure:  COLONOSCOPY  EGD    Relevant Problems   GI/HEPATIC   (+) Esophageal dysphagia   (+) GERD (gastroesophageal reflux disease)        Physical Exam    Airway    Mallampati score: II  TM Distance: >3 FB  Neck ROM: full     Dental   No notable dental hx     Cardiovascular  Cardiovascular exam normal    Pulmonary  Pulmonary exam normal     Other Findings        Anesthesia Plan  ASA Score- 2     Anesthesia Type- IV sedation with anesthesia with ASA Monitors.         Additional Monitors:     Airway Plan:            Plan Factors-Exercise tolerance (METS): >4 METS.    Chart reviewed.   Existing labs reviewed. Patient summary reviewed.    Patient is not a current smoker.              Induction-     Postoperative Plan-     Informed Consent- Anesthetic plan and risks discussed with patient.  I personally reviewed this patient with the CRNA. Discussed and agreed on the Anesthesia Plan with the CRNA..

## 2024-03-28 NOTE — H&P
History and Physical -  [facility]  Anshu Pearson 58 y.o. male MRN: [id number]                  HPI: Anshu Pearson is a 58 y.o. year old male who presents for GERD, surveillance of colon polyps      REVIEW OF SYSTEMS: Per the HPI, and otherwise unremarkable.    Historical Information   Past Medical History:   Diagnosis Date    Anxiety     Colon polyp     Disease of thyroid gland     Esophageal dysphagia     GERD (gastroesophageal reflux disease)     Hyperlipidemia     Hypertension     Seasonal allergies     Tubular adenoma of colon     hx of     Past Surgical History:   Procedure Laterality Date    BAND HEMORRHOIDECTOMY      COLONOSCOPY N/A 05/06/2016    Procedure: COLONOSCOPY;  Surgeon: Scott Arriaga MD;  Location: [facility];  Service:     EGD      HEMORRHOID SURGERY      JOINT REPLACEMENT Left     thr    VT COLONOSCOPY FLX DX W/COLLJ SPEC WHEN PFRMD N/A 09/24/2018    Procedure: COLONOSCOPY with polypectomy;  Surgeon: Willem Israel MD;  Location: [facility];  Service: Colorectal    ROTATOR CUFF REPAIR      left    TONSILLECTOMY      UPPER GASTROINTESTINAL ENDOSCOPY       Social History   Social History     Substance and Sexual Activity   Alcohol Use Yes    Alcohol/week: 7.0 standard drinks of alcohol    Types: 7 Standard drinks or equivalent per week    Comment: social     Social History     Substance and Sexual Activity   Drug Use No     Social History     Tobacco Use   Smoking Status Never   Smokeless Tobacco Former    Types: Chew   Tobacco Comments    Quit 52     Family History   Problem Relation Age of Onset    Colon cancer Maternal Grandfather        Meds/Allergies       Current Outpatient Medications:     atenolol (TENORMIN) 50 mg tablet    Azelastine HCl 137 MCG/SPRAY SOLN    celecoxib (CeleBREX) 200 mg capsule    famotidine (PEPCID) 20 mg tablet    levothyroxine 175 mcg tablet    levothyroxine 200 mcg tablet    losartan (COZAAR) 100 MG tablet    omeprazole (PriLOSEC) 40 MG  "capsule    sertraline (ZOLOFT) 25 mg tablet    cephalexin (KEFLEX) 500 mg capsule    nitroglycerin (Rectiv) 0.4 %    Current Facility-Administered Medications:     lactated ringers infusion, 125 mL/hr, Intravenous, Continuous    lactated ringers infusion, 125 mL/hr, Intravenous, Continuous    Allergies   Allergen Reactions    Ciprofloxacin GI Intolerance    Metronidazole GI Intolerance    Levofloxacin Other (See Comments)    Lisinopril      Other reaction(s): fatigue and lightheaded, anxiety    Verapamil      anxiety       Objective     /69   Pulse (!) 53   Temp (!) 97 °F (36.1 °C) (Temporal)   Resp 18   Ht 5' 7\" (1.702 m)   Wt 83.9 kg (185 lb)   SpO2 96%   BMI 28.98 kg/m²       PHYSICAL EXAM    Gen: NAD  Head: NCAT  CV: RRR  CHEST: Clear  ABD: soft, NT/ND  EXT: no edema      ASSESSMENT/PLAN:  This is a 58 y.o. year old male here for EGD, colonoscopy, and he is stable and optimized for his procedure.        "

## 2024-04-02 PROCEDURE — 88305 TISSUE EXAM BY PATHOLOGIST: CPT | Performed by: PATHOLOGY

## 2024-09-16 ENCOUNTER — NURSE TRIAGE (OUTPATIENT)
Age: 59
End: 2024-09-16

## 2024-09-16 DIAGNOSIS — K57.92 DIVERTICULITIS: Primary | ICD-10-CM

## 2024-09-16 NOTE — TELEPHONE ENCOUNTER
Please call and inform patient that I send in a prescription for Augmentin 875-125 every 12 hours x 10 days.  Please schedule patient for follow-up appointment with GI after he completes his course of antibiotics for diverticulitis.  Tell him to stay on a clear liquid diet for 1 to 2 days and then slowly advance to low fiber diet for 2 weeks.  After 2 weeks he should go back on a high-fiber diet.  May take extra strength acetaminophen as needed for pain.  Please inform him if pain does not improve on antibiotic therapy he should go to the emergency room for evaluation because he is at risk for perforation with diverticulitis.  Tell patient to continue his omeprazole in the morning and Pepcid in the p.m. until he is seen in office. Thank you

## 2024-09-16 NOTE — TELEPHONE ENCOUNTER
Can he be scheduled for an urgent visit in approximately 2 weeks for further evaluation of his symptoms.  If an urgent visit is not possible then you would need to keep current appointment for January 8 with Dr. Guajardo.  If he has reoccurrence of symptoms or increased pain he should go to the emergency room for further evaluation.  Thank you

## 2024-09-16 NOTE — TELEPHONE ENCOUNTER
Last OV: 2/9/24 GERD, cough, throat clearing   Last Colonoscopy: 3/28/24  Last EGD:3/28/24        Next OV: n/a           Pt. Started with LLQ pain on Saturday, pt. Seen at [facility] and was told to f/u with GI, pt. Started with liquid diet on Saturday and felt it helped a little, last night he had a piece of toast and another piece of  toast this morning, still having pain,  denies fever, denies blood stool, pain is non-radiating, last flare was 4 years ago treated with Augmentin, pt. Last seen in February 2024, pt. Taking Omeprazole in the AM and Prilosec in the PM and pt. Is asking if he needs to continue this daily therapy, he was told to take since last EGD/colonoscopy but was unsure for how long, please review and advise      Additional Information   Affirmative: The patients symptoms started >3 days ago, no other symptoms    Answer Questions - Initial Assessment  When did the pain start: Saturday     Is the pain constant or intermittent: Constant, periods of severity     Does the pain radiate: pain does not radiate    Is your LLQ pain tender to touch or worsens after pressing on the abdomen: no    Do you have a history of diverticulitis: Yes; Augmentin, ABDOMINAL SWELLING: No    Are you passing gas: yes    Have you noticed a change in your bowels: no    Does anything make the pain worse: yes, sitting to a standing position pain is worse     Any black or bloody stools: No    Do you have a fever: No or low grade fever (<100°F)    Are you able to eat and drink without difficulty: no    What was your prior treatment for diverticulitis: 4 years ago, treated with Augmentin    Protocols used: LLQ Pain

## 2024-09-25 DIAGNOSIS — K21.00 GASTROESOPHAGEAL REFLUX DISEASE WITH ESOPHAGITIS WITHOUT HEMORRHAGE: ICD-10-CM

## 2024-09-25 RX ORDER — FAMOTIDINE 20 MG/1
40 TABLET, FILM COATED ORAL DAILY
Qty: 180 TABLET | Refills: 1 | Status: SHIPPED | OUTPATIENT
Start: 2024-09-25

## 2024-09-30 ENCOUNTER — OFFICE VISIT (OUTPATIENT)
Dept: GASTROENTEROLOGY | Facility: CLINIC | Age: 59
End: 2024-09-30
Payer: COMMERCIAL

## 2024-09-30 VITALS
HEIGHT: 67 IN | DIASTOLIC BLOOD PRESSURE: 84 MMHG | BODY MASS INDEX: 27 KG/M2 | WEIGHT: 172 LBS | SYSTOLIC BLOOD PRESSURE: 127 MMHG | HEART RATE: 60 BPM

## 2024-09-30 DIAGNOSIS — Z86.0100 PERSONAL HISTORY OF COLONIC POLYPS: ICD-10-CM

## 2024-09-30 DIAGNOSIS — K57.90 DIVERTICULAR DISEASE: ICD-10-CM

## 2024-09-30 DIAGNOSIS — R10.30 LOWER ABDOMINAL PAIN: Primary | ICD-10-CM

## 2024-09-30 DIAGNOSIS — K21.9 GASTROESOPHAGEAL REFLUX DISEASE, UNSPECIFIED WHETHER ESOPHAGITIS PRESENT: ICD-10-CM

## 2024-09-30 PROCEDURE — 99214 OFFICE O/P EST MOD 30 MIN: CPT | Performed by: NURSE PRACTITIONER

## 2024-09-30 NOTE — ASSESSMENT & PLAN NOTE
.  Patient reports intermittent pain in lower abdomen and pelvic area by the pubic bone.  Patient reports that when he walks after about 2 miles he starts to experience pain in his lower abdomen.  Patient did have hip replacement done approximately 2 years ago.  Pain most likely due to to hip prosthesis but need to rule out any other etiology such as musculoskeletal, lesion, kidney stone, etc.  Orders:    CT abdomen pelvis w contrast; Future

## 2024-09-30 NOTE — PATIENT INSTRUCTIONS
Start fiber supplement 2-3 Gummies daily  High fiber diet  Continue to follow antireflux diet and measures  Continue omeprazole and famotidine

## 2024-09-30 NOTE — ASSESSMENT & PLAN NOTE
Patient has history of GERD.  Patient reports GERD is currently well-controlled with omeprazole and famotidine.  Continue antireflux diet and measures  Continue omeprazole 40 mg in the morning  Continue famotidine 20 mg at bedtime

## 2024-09-30 NOTE — ASSESSMENT & PLAN NOTE
Patient had recent episode of diverticulitis which was treated with Augmentin and symptoms did resolve. Patient reported prior to this last episode of diverticulitis was approximately 4 years ago.  -Patient is aware that if he has reoccurrence of diverticulitis episodes this may require surgical intervention in the future.  -High-fiber diet.    -Patient reported prior to attack he did have a roll with sesame seed.  Patient aware that research does not show that seeds, nuts, corn contribute towards diverticulitis but if he is experiencing diverticulitis symptoms when he eats these foods recommendations are to avoid these foods.       Follow up 1 year, If CT scan is abnormal may need to be seen in office sooner

## 2024-09-30 NOTE — PROGRESS NOTES
Ambulatory Visit  Name: Anshu Pearson      : 1965      MRN: [id number]  Encounter Provider: RUPINDER Waldron  Encounter Date: 2024   Encounter department: [facility] [place] [address] [address]    Assessment & Plan  Lower abdominal pain  .  Patient reports intermittent pain in lower abdomen and pelvic area by the pubic bone.  Patient reports that when he walks after about 2 miles he starts to experience pain in his lower abdomen.  Patient did have hip replacement done approximately 2 years ago.  Pain most likely due to to hip prosthesis but need to rule out any other etiology such as musculoskeletal, lesion, kidney stone, etc.  Orders:    CT abdomen pelvis w contrast; Future    Gastroesophageal reflux disease, unspecified whether esophagitis present  Patient has history of GERD.  Patient reports GERD is currently well-controlled with omeprazole and famotidine.  Continue antireflux diet and measures  Continue omeprazole 40 mg in the morning  Continue famotidine 20 mg at bedtime       Personal history of colonic polyps  Patient has history of colon polyps.  Colonoscopy done 3/28/2024 showed 1 subcentimeter polyp in ascending colon.  Diverticulosis of moderate severity and sigmoid colon.  Hemorrhoids.  Biopsy showed tubular adenoma polyp.  -Surveillance colonoscopy due 3/3/2031.       Diverticular disease  Patient had recent episode of diverticulitis which was treated with Augmentin and symptoms did resolve. Patient reported prior to this last episode of diverticulitis was approximately 4 years ago.  -Patient is aware that if he has reoccurrence of diverticulitis episodes this may require surgical intervention in the future.  -High-fiber diet.    -Patient reported prior to attack he did have a roll with sesame seed.  Patient aware that research does not show that seeds, nuts, corn contribute towards diverticulitis but if he is experiencing diverticulitis symptoms when  he eats these foods recommendations are to avoid these foods.       Follow up 1 year, If CT scan is abnormal may need to be seen in office sooner    History of Present Illness     Anshu Pearson is a 59 y.o. male who presents to office for follow-up.  Results of EGD, colonoscopy, and biopsy reviewed with patient.  Patient had recent episode of diverticulitis the beginning of September which was treated with Augmentin and symptoms resolved.  Patient reports GERD symptoms are currently well-controlled on current medication.  Patient denies nausea, vomiting, acid reflux, heartburn, dysphagia, epigastric or upper abdominal pain.  Patient denies blood in stool, blood from rectal area, or black tarry stool.  Abdomen exam benign no abdominal tenderness or guarding.    Patient does report intermittent episodes of lower abdomen and pelvic area pain by pubic bone.  Patient reports that pain will start when he was is walking.  Patient walks approximately 4 miles a day and when he gets to 2 mile interval he will start to experience pain.  Patient did have hip replacement done approximately 2 years ago.  Patient feels the pain may be secondary to the hip prosthesis.     Colonoscopy done 3/28/2024 showed 1 subcentimeter polyp in ascending colon.  Diverticulosis of moderate severity and sigmoid colon.  Hemorrhoids.  Biopsy showed tubular adenoma polyp.  No high-grade dysplasia or carcinoma.  EGD done 3/28/2024 showed esophagus, stomach, and duodenum appeared normal.  1 cm type I hiatal hernia.  Biopsy showed no H. pylori.  Mild chronic inactive gastritis.  GE junction biopsies showed minimal chronic inflammation.  No intraepithelial eosinophils.  No active inflammation.      Review of Systems   Constitutional:  Negative for chills and fever.   HENT:  Negative for ear pain and sore throat.    Eyes:  Negative for pain and visual disturbance.   Respiratory:  Negative for cough and shortness of breath.    Cardiovascular:   Negative for chest pain and palpitations.   Gastrointestinal:  Negative for abdominal pain and vomiting.   Genitourinary:  Negative for dysuria and hematuria.   Musculoskeletal:  Negative for arthralgias and back pain.   Skin:  Negative for color change and rash.   Neurological:  Negative for seizures and syncope.   All other systems reviewed and are negative.      Current Outpatient Medications on File Prior to Visit   Medication Sig Dispense Refill    atenolol (TENORMIN) 50 mg tablet Take 50 mg by mouth daily.      Azelastine HCl 137 MCG/SPRAY SOLN instill 2 sprays into each nostril twice a day as directed      celecoxib (CeleBREX) 200 mg capsule Take 200 mg by mouth every morning      cephalexin (KEFLEX) 500 mg capsule take 4 capsules by mouth 1 hour PRIOR TO DENTAL TREATMENT      famotidine (PEPCID) 20 mg tablet TAKE 2 TABLETS ONCE DAILY 180 tablet 1    levothyroxine 175 mcg tablet Take 175 mcg by mouth daily Twice a week, Tues, Thurs      levothyroxine 200 mcg tablet 5 times a week Sunday, Monday, Wednesday, Friday, Saturday      losartan (COZAAR) 100 MG tablet Take 100 mg by mouth daily.      omeprazole (PriLOSEC) 40 MG capsule Take 40 mg by mouth every other day In morning half hour prior to breakfast      sertraline (ZOLOFT) 25 mg tablet Take 25 mg by mouth daily      nitroglycerin (Rectiv) 0.4 % Insert into the rectum every 12 (twelve) hours (Patient not taking: Reported on 7/5/2023) 30 g 1    [DISCONTINUED] sucralfate (CARAFATE) 1 g/10 mL suspension Take 1 g by mouth       No current facility-administered medications on file prior to visit.      Social History     Tobacco Use    Smoking status: Former    Smokeless tobacco: Former     Types: Chew    Tobacco comments:     Quit 52   Vaping Use    Vaping status: Never Used   Substance and Sexual Activity    Alcohol use: Yes     Alcohol/week: 7.0 standard drinks of alcohol     Types: 7 Standard drinks or equivalent per week     Comment: social    Drug use: No  "   Sexual activity: Not Currently     Partners: Female         Objective     /84   Pulse 60   Ht 5' 7\" (1.702 m)   Wt 78 kg (172 lb)   BMI 26.94 kg/m²     Physical Exam  Vitals and nursing note reviewed.   Constitutional:       General: He is not in acute distress.     Appearance: He is well-developed.   HENT:      Head: Normocephalic and atraumatic.   Eyes:      Conjunctiva/sclera: Conjunctivae normal.   Cardiovascular:      Rate and Rhythm: Normal rate and regular rhythm.      Pulses: Normal pulses.      Heart sounds: Normal heart sounds. No murmur heard.  Pulmonary:      Effort: Pulmonary effort is normal. No respiratory distress.      Breath sounds: Normal breath sounds. No stridor. No wheezing, rhonchi or rales.   Abdominal:      General: Bowel sounds are normal. There is no distension.      Palpations: Abdomen is soft. There is no mass.      Tenderness: There is no abdominal tenderness. There is no guarding.   Musculoskeletal:         General: No swelling.      Cervical back: Neck supple.      Right lower leg: No edema.      Left lower leg: No edema.   Skin:     General: Skin is warm and dry.      Capillary Refill: Capillary refill takes less than 2 seconds.      Coloration: Skin is not jaundiced or pale.   Neurological:      Mental Status: He is alert and oriented to person, place, and time.   Psychiatric:         Mood and Affect: Mood normal.         "

## 2024-09-30 NOTE — ASSESSMENT & PLAN NOTE
Patient has history of colon polyps.  Colonoscopy done 3/28/2024 showed 1 subcentimeter polyp in ascending colon.  Diverticulosis of moderate severity and sigmoid colon.  Hemorrhoids.  Biopsy showed tubular adenoma polyp.  -Surveillance colonoscopy due 3/3/2031.

## 2024-10-06 ENCOUNTER — HOSPITAL ENCOUNTER (OUTPATIENT)
Dept: CT IMAGING | Facility: HOSPITAL | Age: 59
Discharge: HOME | End: 2024-10-06
Payer: COMMERCIAL

## 2024-10-06 DIAGNOSIS — R10.30 LOWER ABDOMINAL PAIN: ICD-10-CM

## 2024-10-06 PROCEDURE — 74177 CT ABD & PELVIS W/CONTRAST: CPT

## 2024-10-06 RX ADMIN — IOHEXOL 100 ML: 350 INJECTION, SOLUTION INTRAVENOUS at 13:24

## 2025-02-12 DIAGNOSIS — K21.00 GASTROESOPHAGEAL REFLUX DISEASE WITH ESOPHAGITIS WITHOUT HEMORRHAGE: ICD-10-CM

## 2025-02-12 RX ORDER — FAMOTIDINE 20 MG/1
40 TABLET, FILM COATED ORAL DAILY
Qty: 180 TABLET | Refills: 1 | Status: SHIPPED | OUTPATIENT
Start: 2025-02-12

## 2025-02-24 ENCOUNTER — NURSE TRIAGE (OUTPATIENT)
Age: 60
End: 2025-02-24

## 2025-02-24 DIAGNOSIS — K21.9 GASTROESOPHAGEAL REFLUX DISEASE, UNSPECIFIED WHETHER ESOPHAGITIS PRESENT: ICD-10-CM

## 2025-02-24 DIAGNOSIS — R11.0 NAUSEA: Primary | ICD-10-CM

## 2025-02-24 DIAGNOSIS — K21.00 GASTROESOPHAGEAL REFLUX DISEASE WITH ESOPHAGITIS WITHOUT HEMORRHAGE: ICD-10-CM

## 2025-02-24 RX ORDER — FAMOTIDINE 20 MG/1
40 TABLET, FILM COATED ORAL
Qty: 180 TABLET | Refills: 1 | Status: SHIPPED | OUTPATIENT
Start: 2025-02-24 | End: 2025-05-25

## 2025-02-24 RX ORDER — OMEPRAZOLE 40 MG/1
40 CAPSULE, DELAYED RELEASE ORAL
Qty: 180 CAPSULE | Refills: 1 | Status: SHIPPED | OUTPATIENT
Start: 2025-02-24 | End: 2025-05-25

## 2025-02-24 NOTE — TELEPHONE ENCOUNTER
Patients GI provider:  Dr. Guajardo    Number to return call: [phone]    Reason for call: Pt calling to report GERD flare.   Transferred to [place] in triage for further assistance.    Scheduled procedure/appointment date if applicable: N/A

## 2025-02-24 NOTE — TELEPHONE ENCOUNTER
Patient calling to say that he is having a gerd flare for several weeks.  Patient started 3 weeks ago with acid reflux, something stuck sensation.  Has appt with ENT on Thursday for checkup.  States is following diet.  Denies vomiting but does have some nausea. Denies constipation and diarrhea.  Is getting post nasal drip which can cause nausea.  Eating makes worse.  Taking omeprazole 40 mg in morning and then Pecid 40 mg at night.  Morning seems to be worse.  Has been getting increasingly worse.  Advised pepcid twice daily and omeprazole twice daily as well.  [facility] for twice daily.      Reason for Disposition   The patient is on PPI once a day with continued epigastric discomfort, reflux, regurgitation    Answer Assessment - Initial Assessment Questions  1. Do you have a history of GERD?  yes  2. When did your symptoms start? Please describe your symptoms and how often you experience these symptoms.  Getting worse over last 3 weeks  3. Are you taking any acid reducing medications currently such as: Prilosec (Omeprazole), Protonix (Pantoprazole), Prevacid (Lansoprazole), Nexium (Esomeprazole), Aciphex (Rabeprazole), Dexilant (Dexlansoprazole)?  Omeprazole 40 daily  4. Have you tried any OTC acid reducing medications? (If so, which medications have you tried?) Zantac (Ranitidine), Pepcid (Famotidine), Tagamet (Cimetidine) Tums, Rolaids, Maalox, Mylanta.  Pepcid 40 mg daily  5. What was the outcome of taking the medications which you have for these symptoms?  Getting worse  6. Have you experienced any recent changes in your bowel habits?  denies  7. Have you had any new life stressors or diet changes?  denies  8. Does anything make your symptoms better?  denies  9. Have you had any recent blood work, imaging, or procedures done? If yes, what were those?   denies    Protocols used: GI-Gerd-ADULT-[place]

## 2025-02-24 NOTE — TELEPHONE ENCOUNTER
I called and spoke with patient, reviewed all provider recommendations and orders. He verbally understood and will schedule US. No further action needed at this time.

## 2025-02-24 NOTE — TELEPHONE ENCOUNTER
Please call and inform patient that I send a prescription for omeprazole 40 mg 2 times a day to [facility] he should take it half an hour prior to breakfast and half an hour prior to dinner.  He should take famotidine 40 mg daily at bedtime this was also sent to [facility].  I also ordered an ultrasound of the abdomen please tell him to call [phone] to schedule.  Reinforced following antireflux diet and measures.  Please advise him to eat slow, chew well, alternate solids and liquids and  remian upright with  eating and to remain upright for 1 hour after eating.

## 2025-03-03 ENCOUNTER — HOSPITAL ENCOUNTER (OUTPATIENT)
Dept: RADIOLOGY | Facility: IMAGING CENTER | Age: 60
Discharge: HOME | End: 2025-03-03
Payer: COMMERCIAL

## 2025-03-03 DIAGNOSIS — R11.0 NAUSEA: ICD-10-CM

## 2025-03-03 DIAGNOSIS — K21.00 GASTROESOPHAGEAL REFLUX DISEASE WITH ESOPHAGITIS WITHOUT HEMORRHAGE: ICD-10-CM

## 2025-03-03 PROCEDURE — 76705 ECHO EXAM OF ABDOMEN: CPT

## 2025-03-23 ENCOUNTER — RESULTS FOLLOW-UP (OUTPATIENT)
Age: 60
End: 2025-03-23

## 2025-03-24 ENCOUNTER — OFFICE VISIT (OUTPATIENT)
Dept: GASTROENTEROLOGY | Facility: CLINIC | Age: 60
End: 2025-03-24
Payer: COMMERCIAL

## 2025-03-24 ENCOUNTER — TELEPHONE (OUTPATIENT)
Dept: GASTROENTEROLOGY | Facility: CLINIC | Age: 60
End: 2025-03-24

## 2025-03-24 VITALS
SYSTOLIC BLOOD PRESSURE: 158 MMHG | WEIGHT: 185 LBS | HEIGHT: 67 IN | DIASTOLIC BLOOD PRESSURE: 93 MMHG | HEART RATE: 65 BPM | BODY MASS INDEX: 29.03 KG/M2

## 2025-03-24 DIAGNOSIS — K21.00 GASTROESOPHAGEAL REFLUX DISEASE WITH ESOPHAGITIS WITHOUT HEMORRHAGE: Primary | ICD-10-CM

## 2025-03-24 DIAGNOSIS — Z86.0100 HX OF COLONIC POLYPS: ICD-10-CM

## 2025-03-24 PROCEDURE — 99214 OFFICE O/P EST MOD 30 MIN: CPT | Performed by: PHYSICIAN ASSISTANT

## 2025-03-24 RX ORDER — SODIUM CHLORIDE, SODIUM LACTATE, POTASSIUM CHLORIDE, CALCIUM CHLORIDE 600; 310; 30; 20 MG/100ML; MG/100ML; MG/100ML; MG/100ML
125 INJECTION, SOLUTION INTRAVENOUS CONTINUOUS
OUTPATIENT
Start: 2025-03-24

## 2025-03-24 NOTE — TELEPHONE ENCOUNTER
Scheduled date of EGD with [facility] (as of today):4/25/25  Physician performing EGD with black :Dr Guajardo   Location of EGD with [facility] :[facility]   Instructions reviewed with patient by:amber  Clearances: none   Pt received instructions at ov . Amber Hall notified by secure chat. Amber  3/24/25

## 2025-03-24 NOTE — ASSESSMENT & PLAN NOTE
"Patient reports that over the last month he has been experiencing frequent throat clearing and feeling like there is \"lump\" in his throat. He denies dysphagia but feels that it does take longer for food to \"go over the lump\". Reports this happens with any food that he eats. He also reports occasional heartburn that can be severe. He denies odynophagia, abdominal pain, nausea, vomiting. Recently had EGD about 1 year ago which was unremarkable with exception of type I hiatal hernia. RUQ US was recently performed and was normal. He does have a history of dysphagia in the past and had barium esophagram done in 2022 which was unremarkable. Recommend to continue PPI twice daily and famotidine at night. Will also order pH testing for further evaluation of symptoms and consider further workup pending course.  Will do 96-hour with 48 hours off PPI in 48 hours on to assess response to PPI.  Patient symptoms are atypical as he mostly has globus sensation and throat clearing as his biggest symptoms so we will assess acid exposure.    "

## 2025-03-24 NOTE — PROGRESS NOTES
"Name: Anshu Pearson      : 1965      MRN: [id number]  Encounter Provider: Gypsy Reynaga PA-C  Encounter Date: 3/24/2025   Encounter department: [facility] GASTROENTEROLOGY [place] [address]  :  Assessment & Plan  Gastroesophageal reflux disease with esophagitis without hemorrhage  Patient reports that over the last month he has been experiencing frequent throat clearing and feeling like there is \"lump\" in his throat. He denies dysphagia but feels that it does take longer for food to \"go over the lump\". Reports this happens with any food that he eats. He also reports occasional heartburn that can be severe. He denies odynophagia, abdominal pain, nausea, vomiting. Recently had EGD about 1 year ago which was unremarkable with exception of type I hiatal hernia. RUQ US was recently performed and was normal. He does have a history of dysphagia in the past and had barium esophagram done in  which was unremarkable. Recommend to continue PPI twice daily and famotidine at night. Will also order pH testing for further evaluation of symptoms and consider further workup pending course.  Will do 96-hour with 48 hours off PPI in 48 hours on to assess response to PPI.  Patient symptoms are atypical as he mostly has globus sensation and throat clearing as his biggest symptoms so we will assess acid exposure.    Hx of colonic polyps  Tubular adenoma noted on most recent colonoscopy 3/2024. Repeat colonoscopy recommended in 7 years so patient is due in .            History of Present Illness   Anshu Pearson is a 59 y.o. male who presents for evaluation due to GERD symptoms. Patient reports that for the last month and a half he has had a \"flare\" of GERD symptoms. States that he feels like there is a lump in his throat and that it takes time for food to get over the lump when he is eating. He reports that symptoms start after taking his AM medications. No specific dietary triggers. He " does report occasional episodes of severe heartburn. He reports more frequent throat clearing. He denies nausea, vomiting, abdominal pain, odynophagia. He reports that he was taking Aleve daily for a couple of months due to shoulder replacement and now is taking Celebrex. He does not smoke. He did contact GI office and omeprazole was increased to BID and he has been taking famotidine at night but he has not noticed much improvement in his symptoms with this. He was seen by ENT last month with findings consistent with LPR. He also had RUQ US done earlier this month which was normal. He had a barium swallow done in 2022 due to dysphagia at the time and it was unremarkable. Last EGD was 3/2024 which showed type I hiatal hernia, normal-appearing esophagus, stomach, and duodenum. Stomach and esophageal biopsies at the time were unremarkable.       HPI    Review of Systems A complete review of systems is negative other than that noted above in the HPI.      Current Outpatient Medications   Medication Sig Dispense Refill    atenolol (TENORMIN) 50 mg tablet Take 50 mg by mouth daily.      Azelastine HCl 137 MCG/SPRAY SOLN instill 2 sprays into each nostril twice a day as directed      celecoxib (CeleBREX) 200 mg capsule Take 200 mg by mouth every morning      cephalexin (KEFLEX) 500 mg capsule take 4 capsules by mouth 1 hour PRIOR TO DENTAL TREATMENT      famotidine (PEPCID) 20 mg tablet Take 2 tablets (40 mg total) by mouth daily at bedtime 180 tablet 1    levothyroxine 175 mcg tablet Take 175 mcg by mouth daily Twice a week, Tues, Thurs      levothyroxine 200 mcg tablet 5 times a week Sunday, Monday, Wednesday, Friday, Saturday      losartan (COZAAR) 100 MG tablet Take 100 mg by mouth daily.      omeprazole (PriLOSEC) 40 MG capsule Take 1 capsule (40 mg total) by mouth 2 (two) times a day before meals 180 capsule 1    nitroglycerin (Rectiv) 0.4 % Insert into the rectum every 12 (twelve) hours (Patient not taking: Reported  "on 7/5/2023) 30 g 1    sertraline (ZOLOFT) 25 mg tablet Take 25 mg by mouth daily       No current facility-administered medications for this visit.     Objective   /93 (BP Location: Left arm, Patient Position: Sitting, Cuff Size: Standard)   Pulse 65   Ht 5' 7\" (1.702 m)   Wt 83.9 kg (185 lb)   BMI 28.98 kg/m²     Physical Exam   Gen- alert, nad  Heart-s1/s2  Lungs- CTA b/l  Abd- soft, +bs. NT, ND, no r/r/g    Lab Results: I personally reviewed relevant lab results.       Results for orders placed during the hospital encounter of 03/28/24    EGD    Impression  The esophagus appeared normal.  1 cm type I hiatal hernia  Performed forceps biopsies in the GE junction  The stomach appeared normal. Performed random biopsy to rule out H. pylori.  The duodenum appeared normal.      RECOMMENDATION:  Await pathology results  Continue omeprazole            Julien Guajardo MD    Colonoscopy -2024  MPRESSION:  Subcentimeter polyp in the ascending colon was removed with cold forceps biopsy  Diverticulosis of moderate severity in the sigmoid colon  Hemorrhoids           RECOMMENDATION:    Repeat colonoscopy in 7 years     Personal history of colon polyps           Path-   A.  Stomach, antrum, erythema, biopsy:     - Chronic inactive antral gastritis.     - No H. pylori organisms identified on H&E stained sections.     - No intestinal metaplasia, dysplasia or neoplasia identified.      B.  Gastroesophageal junction, biopsy:     - Squamous esophageal mucosa with minimal chronic inflammation.     - No intraepithelial eosinophils identified.     - No active inflammation, organisms, gastric type mucosa, dysplasia or neoplasia identified.     C.  Colon, ascending, polyp:     - Tubular adenoma.     - No high grade dysplasia or carcinoma identified.      "

## 2025-06-27 ENCOUNTER — TELEPHONE (OUTPATIENT)
Age: 60
End: 2025-06-27

## 2025-06-27 NOTE — TELEPHONE ENCOUNTER
Dr Guajardo , please see note and advise if you want pt to have a f/u.  I can canx his egd/black. Thanks Althea

## 2025-06-27 NOTE — TELEPHONE ENCOUNTER
Patients GI provider:  Dr. Guajardo    Number to return call: [phone]    Reason for call: Pt called to cancel his EGD/Bravo as he said he has been off his medication since April and is having no problems. He also asked if Dr Guajardo wants him to schedule a F.U let him know. Please reach out to pt    Scheduled procedure/appointment date if applicable: Apt/procedure 07/08/25

## 2025-08-22 DIAGNOSIS — K21.00 GASTROESOPHAGEAL REFLUX DISEASE WITH ESOPHAGITIS WITHOUT HEMORRHAGE: ICD-10-CM

## 2025-08-22 RX ORDER — OMEPRAZOLE 40 MG/1
CAPSULE, DELAYED RELEASE ORAL
Qty: 180 CAPSULE | Refills: 3 | Status: SHIPPED | OUTPATIENT
Start: 2025-08-22

## (undated) DEVICE — SINGLE-USE POLYPECTOMY SNARE: Brand: ROTATABLE SNARE